# Patient Record
Sex: MALE | Race: OTHER | HISPANIC OR LATINO | URBAN - METROPOLITAN AREA
[De-identification: names, ages, dates, MRNs, and addresses within clinical notes are randomized per-mention and may not be internally consistent; named-entity substitution may affect disease eponyms.]

---

## 2020-08-25 ENCOUNTER — INPATIENT (INPATIENT)
Facility: HOSPITAL | Age: 50
LOS: 4 days | Discharge: HOME | End: 2020-08-30
Attending: HOSPITALIST | Admitting: HOSPITALIST
Payer: SELF-PAY

## 2020-08-25 VITALS
TEMPERATURE: 103 F | RESPIRATION RATE: 20 BRPM | SYSTOLIC BLOOD PRESSURE: 137 MMHG | OXYGEN SATURATION: 99 % | DIASTOLIC BLOOD PRESSURE: 79 MMHG | HEART RATE: 124 BPM

## 2020-08-25 DIAGNOSIS — Z29.9 ENCOUNTER FOR PROPHYLACTIC MEASURES, UNSPECIFIED: ICD-10-CM

## 2020-08-25 DIAGNOSIS — E11.9 TYPE 2 DIABETES MELLITUS WITHOUT COMPLICATIONS: ICD-10-CM

## 2020-08-25 DIAGNOSIS — Z02.9 ENCOUNTER FOR ADMINISTRATIVE EXAMINATIONS, UNSPECIFIED: ICD-10-CM

## 2020-08-25 DIAGNOSIS — Z98.890 OTHER SPECIFIED POSTPROCEDURAL STATES: Chronic | ICD-10-CM

## 2020-08-25 DIAGNOSIS — A41.9 SEPSIS, UNSPECIFIED ORGANISM: ICD-10-CM

## 2020-08-25 DIAGNOSIS — J18.9 PNEUMONIA, UNSPECIFIED ORGANISM: ICD-10-CM

## 2020-08-25 DIAGNOSIS — Z90.49 ACQUIRED ABSENCE OF OTHER SPECIFIED PARTS OF DIGESTIVE TRACT: Chronic | ICD-10-CM

## 2020-08-25 LAB
ALBUMIN SERPL ELPH-MCNC: 4.7 G/DL — SIGNIFICANT CHANGE UP (ref 3.3–5)
ALP SERPL-CCNC: 81 U/L — SIGNIFICANT CHANGE UP (ref 40–120)
ALT FLD-CCNC: 12 U/L — SIGNIFICANT CHANGE UP (ref 4–41)
ANION GAP SERPL CALC-SCNC: 16 MMO/L — HIGH (ref 7–14)
APPEARANCE UR: CLEAR — SIGNIFICANT CHANGE UP
APTT BLD: 32.8 SEC — SIGNIFICANT CHANGE UP (ref 27–36.3)
AST SERPL-CCNC: 14 U/L — SIGNIFICANT CHANGE UP (ref 4–40)
BACTERIA # UR AUTO: NEGATIVE — SIGNIFICANT CHANGE UP
BASE EXCESS BLDV CALC-SCNC: -1.4 MMOL/L — SIGNIFICANT CHANGE UP
BASOPHILS # BLD AUTO: 0.04 K/UL — SIGNIFICANT CHANGE UP (ref 0–0.2)
BASOPHILS NFR BLD AUTO: 0.2 % — SIGNIFICANT CHANGE UP (ref 0–2)
BILIRUB SERPL-MCNC: 1.4 MG/DL — HIGH (ref 0.2–1.2)
BILIRUB UR-MCNC: NEGATIVE — SIGNIFICANT CHANGE UP
BLOOD GAS VENOUS - CREATININE: 0.76 MG/DL — SIGNIFICANT CHANGE UP (ref 0.5–1.3)
BLOOD UR QL VISUAL: NEGATIVE — SIGNIFICANT CHANGE UP
BUN SERPL-MCNC: 12 MG/DL — SIGNIFICANT CHANGE UP (ref 7–23)
CALCIUM SERPL-MCNC: 9.2 MG/DL — SIGNIFICANT CHANGE UP (ref 8.4–10.5)
CHLORIDE BLDV-SCNC: 107 MMOL/L — SIGNIFICANT CHANGE UP (ref 96–108)
CHLORIDE SERPL-SCNC: 91 MMOL/L — LOW (ref 98–107)
CO2 SERPL-SCNC: 21 MMOL/L — LOW (ref 22–31)
COLOR SPEC: SIGNIFICANT CHANGE UP
CREAT SERPL-MCNC: 0.81 MG/DL — SIGNIFICANT CHANGE UP (ref 0.5–1.3)
EOSINOPHIL # BLD AUTO: 0 K/UL — SIGNIFICANT CHANGE UP (ref 0–0.5)
EOSINOPHIL NFR BLD AUTO: 0 % — SIGNIFICANT CHANGE UP (ref 0–6)
GAS PNL BLDV: 129 MMOL/L — LOW (ref 136–146)
GLUCOSE BLDC GLUCOMTR-MCNC: 222 MG/DL — HIGH (ref 70–99)
GLUCOSE BLDC GLUCOMTR-MCNC: 225 MG/DL — HIGH (ref 70–99)
GLUCOSE BLDV-MCNC: 230 MG/DL — HIGH (ref 70–99)
GLUCOSE SERPL-MCNC: 284 MG/DL — HIGH (ref 70–99)
GLUCOSE UR-MCNC: >1000 — HIGH
HCO3 BLDV-SCNC: 23 MMOL/L — SIGNIFICANT CHANGE UP (ref 20–27)
HCT VFR BLD CALC: 44 % — SIGNIFICANT CHANGE UP (ref 39–50)
HCT VFR BLDV CALC: 41.7 % — SIGNIFICANT CHANGE UP (ref 39–51)
HGB BLD-MCNC: 15.3 G/DL — SIGNIFICANT CHANGE UP (ref 13–17)
HGB BLDV-MCNC: 13.6 G/DL — SIGNIFICANT CHANGE UP (ref 13–17)
HYALINE CASTS # UR AUTO: NEGATIVE — SIGNIFICANT CHANGE UP
IMM GRANULOCYTES NFR BLD AUTO: 0.5 % — SIGNIFICANT CHANGE UP (ref 0–1.5)
INR BLD: 1.41 — HIGH (ref 0.88–1.16)
KETONES UR-MCNC: HIGH
LACTATE BLDV-MCNC: 1.2 MMOL/L — SIGNIFICANT CHANGE UP (ref 0.5–2)
LACTATE BLDV-MCNC: 2.2 MMOL/L — HIGH (ref 0.5–2)
LEUKOCYTE ESTERASE UR-ACNC: NEGATIVE — SIGNIFICANT CHANGE UP
LIDOCAIN IGE QN: 23.6 U/L — SIGNIFICANT CHANGE UP (ref 7–60)
LYMPHOCYTES # BLD AUTO: 1.2 K/UL — SIGNIFICANT CHANGE UP (ref 1–3.3)
LYMPHOCYTES # BLD AUTO: 5.4 % — LOW (ref 13–44)
MCHC RBC-ENTMCNC: 30.6 PG — SIGNIFICANT CHANGE UP (ref 27–34)
MCHC RBC-ENTMCNC: 34.8 % — SIGNIFICANT CHANGE UP (ref 32–36)
MCV RBC AUTO: 88 FL — SIGNIFICANT CHANGE UP (ref 80–100)
MONOCYTES # BLD AUTO: 1.55 K/UL — HIGH (ref 0–0.9)
MONOCYTES NFR BLD AUTO: 7 % — SIGNIFICANT CHANGE UP (ref 2–14)
NEUTROPHILS # BLD AUTO: 19.13 K/UL — HIGH (ref 1.8–7.4)
NEUTROPHILS NFR BLD AUTO: 86.9 % — HIGH (ref 43–77)
NITRITE UR-MCNC: NEGATIVE — SIGNIFICANT CHANGE UP
NRBC # FLD: 0 K/UL — SIGNIFICANT CHANGE UP (ref 0–0)
PCO2 BLDV: 36 MMHG — LOW (ref 41–51)
PH BLDV: 7.42 PH — SIGNIFICANT CHANGE UP (ref 7.32–7.43)
PH UR: 6 — SIGNIFICANT CHANGE UP (ref 5–8)
PLATELET # BLD AUTO: 266 K/UL — SIGNIFICANT CHANGE UP (ref 150–400)
PMV BLD: 11.4 FL — SIGNIFICANT CHANGE UP (ref 7–13)
PO2 BLDV: 51 MMHG — HIGH (ref 35–40)
POTASSIUM BLDV-SCNC: 3.7 MMOL/L — SIGNIFICANT CHANGE UP (ref 3.4–4.5)
POTASSIUM SERPL-MCNC: 3.6 MMOL/L — SIGNIFICANT CHANGE UP (ref 3.5–5.3)
POTASSIUM SERPL-SCNC: 3.6 MMOL/L — SIGNIFICANT CHANGE UP (ref 3.5–5.3)
PROT SERPL-MCNC: 7.7 G/DL — SIGNIFICANT CHANGE UP (ref 6–8.3)
PROT UR-MCNC: 50 — SIGNIFICANT CHANGE UP
PROTHROM AB SERPL-ACNC: 15.9 SEC — HIGH (ref 10.6–13.6)
RBC # BLD: 5 M/UL — SIGNIFICANT CHANGE UP (ref 4.2–5.8)
RBC # FLD: 12.6 % — SIGNIFICANT CHANGE UP (ref 10.3–14.5)
RBC CASTS # UR COMP ASSIST: SIGNIFICANT CHANGE UP (ref 0–?)
SAO2 % BLDV: 85.7 % — HIGH (ref 60–85)
SODIUM SERPL-SCNC: 128 MMOL/L — LOW (ref 135–145)
SP GR SPEC: 1.01 — SIGNIFICANT CHANGE UP (ref 1–1.04)
SQUAMOUS # UR AUTO: SIGNIFICANT CHANGE UP
UROBILINOGEN FLD QL: NORMAL — SIGNIFICANT CHANGE UP
WBC # BLD: 22.02 K/UL — HIGH (ref 3.8–10.5)
WBC # FLD AUTO: 22.02 K/UL — HIGH (ref 3.8–10.5)
WBC UR QL: SIGNIFICANT CHANGE UP (ref 0–?)

## 2020-08-25 PROCEDURE — 74177 CT ABD & PELVIS W/CONTRAST: CPT | Mod: 26

## 2020-08-25 PROCEDURE — 99285 EMERGENCY DEPT VISIT HI MDM: CPT

## 2020-08-25 PROCEDURE — 99223 1ST HOSP IP/OBS HIGH 75: CPT | Mod: GC

## 2020-08-25 PROCEDURE — 71045 X-RAY EXAM CHEST 1 VIEW: CPT | Mod: 26

## 2020-08-25 RX ORDER — DEXTROSE 50 % IN WATER 50 %
25 SYRINGE (ML) INTRAVENOUS ONCE
Refills: 0 | Status: DISCONTINUED | OUTPATIENT
Start: 2020-08-25 | End: 2020-08-30

## 2020-08-25 RX ORDER — ACETAMINOPHEN 500 MG
650 TABLET ORAL EVERY 6 HOURS
Refills: 0 | Status: DISCONTINUED | OUTPATIENT
Start: 2020-08-25 | End: 2020-08-30

## 2020-08-25 RX ORDER — INSULIN LISPRO 100/ML
VIAL (ML) SUBCUTANEOUS
Refills: 0 | Status: DISCONTINUED | OUTPATIENT
Start: 2020-08-25 | End: 2020-08-30

## 2020-08-25 RX ORDER — ENOXAPARIN SODIUM 100 MG/ML
40 INJECTION SUBCUTANEOUS DAILY
Refills: 0 | Status: DISCONTINUED | OUTPATIENT
Start: 2020-08-25 | End: 2020-08-30

## 2020-08-25 RX ORDER — ONDANSETRON 8 MG/1
4 TABLET, FILM COATED ORAL ONCE
Refills: 0 | Status: COMPLETED | OUTPATIENT
Start: 2020-08-25 | End: 2020-08-25

## 2020-08-25 RX ORDER — CEFTRIAXONE 500 MG/1
1000 INJECTION, POWDER, FOR SOLUTION INTRAMUSCULAR; INTRAVENOUS EVERY 24 HOURS
Refills: 0 | Status: COMPLETED | OUTPATIENT
Start: 2020-08-25 | End: 2020-08-30

## 2020-08-25 RX ORDER — SODIUM CHLORIDE 9 MG/ML
2500 INJECTION INTRAMUSCULAR; INTRAVENOUS; SUBCUTANEOUS ONCE
Refills: 0 | Status: COMPLETED | OUTPATIENT
Start: 2020-08-25 | End: 2020-08-25

## 2020-08-25 RX ORDER — SENNA PLUS 8.6 MG/1
2 TABLET ORAL AT BEDTIME
Refills: 0 | Status: DISCONTINUED | OUTPATIENT
Start: 2020-08-25 | End: 2020-08-30

## 2020-08-25 RX ORDER — DEXTROSE 50 % IN WATER 50 %
15 SYRINGE (ML) INTRAVENOUS ONCE
Refills: 0 | Status: DISCONTINUED | OUTPATIENT
Start: 2020-08-25 | End: 2020-08-30

## 2020-08-25 RX ORDER — METFORMIN HYDROCHLORIDE 850 MG/1
1 TABLET ORAL
Qty: 0 | Refills: 0 | DISCHARGE

## 2020-08-25 RX ORDER — ACETAMINOPHEN 500 MG
650 TABLET ORAL ONCE
Refills: 0 | Status: COMPLETED | OUTPATIENT
Start: 2020-08-25 | End: 2020-08-25

## 2020-08-25 RX ORDER — INSULIN LISPRO 100/ML
VIAL (ML) SUBCUTANEOUS AT BEDTIME
Refills: 0 | Status: DISCONTINUED | OUTPATIENT
Start: 2020-08-25 | End: 2020-08-30

## 2020-08-25 RX ORDER — GLUCAGON INJECTION, SOLUTION 0.5 MG/.1ML
1 INJECTION, SOLUTION SUBCUTANEOUS ONCE
Refills: 0 | Status: DISCONTINUED | OUTPATIENT
Start: 2020-08-25 | End: 2020-08-30

## 2020-08-25 RX ORDER — PIPERACILLIN AND TAZOBACTAM 4; .5 G/20ML; G/20ML
3.38 INJECTION, POWDER, LYOPHILIZED, FOR SOLUTION INTRAVENOUS ONCE
Refills: 0 | Status: COMPLETED | OUTPATIENT
Start: 2020-08-25 | End: 2020-08-25

## 2020-08-25 RX ORDER — INSULIN HUMAN 100 [IU]/ML
5 INJECTION, SOLUTION SUBCUTANEOUS ONCE
Refills: 0 | Status: COMPLETED | OUTPATIENT
Start: 2020-08-25 | End: 2020-08-25

## 2020-08-25 RX ORDER — DEXTROSE 50 % IN WATER 50 %
12.5 SYRINGE (ML) INTRAVENOUS ONCE
Refills: 0 | Status: DISCONTINUED | OUTPATIENT
Start: 2020-08-25 | End: 2020-08-30

## 2020-08-25 RX ORDER — AZITHROMYCIN 500 MG/1
500 TABLET, FILM COATED ORAL DAILY
Refills: 0 | Status: DISCONTINUED | OUTPATIENT
Start: 2020-08-25 | End: 2020-08-27

## 2020-08-25 RX ORDER — SODIUM CHLORIDE 9 MG/ML
1000 INJECTION, SOLUTION INTRAVENOUS
Refills: 0 | Status: DISCONTINUED | OUTPATIENT
Start: 2020-08-25 | End: 2020-08-30

## 2020-08-25 RX ADMIN — ONDANSETRON 4 MILLIGRAM(S): 8 TABLET, FILM COATED ORAL at 19:12

## 2020-08-25 RX ADMIN — Medication 650 MILLIGRAM(S): at 21:06

## 2020-08-25 RX ADMIN — SODIUM CHLORIDE 2500 MILLILITER(S): 9 INJECTION INTRAMUSCULAR; INTRAVENOUS; SUBCUTANEOUS at 19:12

## 2020-08-25 RX ADMIN — PIPERACILLIN AND TAZOBACTAM 200 GRAM(S): 4; .5 INJECTION, POWDER, LYOPHILIZED, FOR SOLUTION INTRAVENOUS at 19:12

## 2020-08-25 RX ADMIN — Medication 650 MILLIGRAM(S): at 19:12

## 2020-08-25 RX ADMIN — Medication 650 MILLIGRAM(S): at 23:12

## 2020-08-25 RX ADMIN — INSULIN HUMAN 5 UNIT(S): 100 INJECTION, SOLUTION SUBCUTANEOUS at 22:16

## 2020-08-25 NOTE — H&P ADULT - PROBLEM SELECTOR PLAN 1
CAP; Given one dose of zosyn, no cultures drawn  - Blood culture  - sputum culture  - Respiratory viral panel  - urine strep pneumo, legionella   - CTX and Azithromycin  - trend fever curve, WBC  - f/u covid result, although lower suspicion  - encourage PO hydration  - trend procalcitonin CAP; Given one dose of zosyn, no cultures drawn  - Blood culture  - sputum culture  - Respiratory viral panel  - urine legionella   - CTX and Azithromycin  - trend fever curve, WBC  - f/u covid result, although lower suspicion  - encourage PO hydration  - trend procalcitonin febrile, tachycardia, leukocytosis, evidence of PNA, will obtain cultures, Abx as below, start IVF

## 2020-08-25 NOTE — ED ADULT TRIAGE NOTE - CHIEF COMPLAINT QUOTE
states" I have nausea chest and back pain since Saturday with elevated blood sugar" fever 102.5 in triage. . h/o DM. denies any sick contacts. c/o body aches.

## 2020-08-25 NOTE — ED ADULT NURSE NOTE - NSIMPLEMENTINTERV_GEN_ALL_ED
Implemented All Fall Risk Interventions:  [place] to call system. Call bell, personal items and telephone within reach. Instruct patient to call for assistance. Room bathroom lighting operational. Non-slip footwear when patient is off stretcher. Physically safe environment: no spills, clutter or unnecessary equipment. Stretcher in lowest position, wheels locked, appropriate side rails in place. Provide visual cue, wrist band, yellow gown, etc. Monitor gait and stability. Monitor for mental status changes and reorient to person, place, and time. Review medications for side effects contributing to fall risk. Reinforce activity limits and safety measures with patient and family.
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
## 2020-08-25 NOTE — H&P ADULT - NSHPREVIEWOFSYSTEMS_GEN_ALL_CORE
REVIEW OF SYSTEMS:    CONSTITUTIONAL: + for weakness, fevers or chills  EYES/ENT: intermittently blurry vision (not acute);  No vertigo or throat pain   NECK: No pain or stiffness  RESPIRATORY: No cough, wheezing, hemoptysis; No shortness of breath  CARDIOVASCULAR: No chest pain or palpitations  GASTROINTESTINAL: + abdominal pain, nausea, diarrhe. No vomiting, or hematemesis; No constipation. No melena or hematochezia.  GENITOURINARY: No dysuria, frequency or hematuria  NEUROLOGICAL: No numbness  SKIN: No itching, rashes Constitutional Symptoms: +weakness, fevers, chills  Eyes: No visual changes, headache, eye pain, double vision  Ears, Nose, Mouth, Throat: No runny nose, sinus pain, ear pain, tinnitus, sore throat, dysphagia, odynophagia  Cardiovascular: No chest pain, palpitations, edema  Respiratory: No cough, wheezing, hemoptysis, shortness of breath  Gastrointestinal: + abdominal pain, nausea, diarrhea. No vomiting, or hematemesis; No constipation. No melena or hematochezia.  Genitourinary: No dysuria, frequency, hematuria  Musculoskeletal: No joint pain, joint swelling, decreased ROM  Skin: No pruritus, rashes, lesions, wounds  Neurologic:  No seizures, headache, paraesthesia, numbness, limb weakness    Positives and pertinent negatives noted and all other systems negative.

## 2020-08-25 NOTE — H&P ADULT - PROBLEM SELECTOR PLAN 5
Transitions of Care Status:  1.  Name of PCP: None  2.  PCP Contacted on Admission: [ ] Y    [ ] N    3.  PCP contacted at Discharge: [ ] Y    [ ] N    [ ] N/A  4.  Post-Discharge Appointment Date and Location:  5.  Summary of Handoff given to PCP:

## 2020-08-25 NOTE — H&P ADULT - NSHPSOCIALHISTORY_GEN_ALL_CORE
Pt lives with wife and one daughter (other daughter recently moved out). Works as supervisor for [employer]. 22 pack yr smoking hx, denies alc/drug use. Of note, sister was acting as PCP for pt

## 2020-08-25 NOTE — ED PROVIDER NOTE - CLINICAL SUMMARY MEDICAL DECISION MAKING FREE TEXT BOX
fever, abdominal pain, diarrhea. exam sig for epigastric tenderness - differential includes sepsis 2/2 pancreatitis/ colitis. plan for sepsis work up, COVID swab, symptomatic care with tylenol/ zofran, u/a. Will cover with Zosyn and give IVF and also r/o DKA. fever, abdominal pain, diarrhea. exam sig for epigastric tenderness - differential includes sepsis 2/2 pancreatitis/ colitis. plan for sepsis work up, COVID swab, symptomatic care with tylenol/ zofran, u/a. Will cover with Zosyn and give IVF and also r/o DKA. CT Abd pelvis showing RLL pneumonia.  Urine with ketones, will give insulin and continue zosyn. Will admit to medicine Sugar ASCENCIO: fever, abdominal pain, diarrhea. exam sig for epigastric tenderness - differential includes sepsis 2/2 pancreatitis/ colitis. plan for sepsis work up, COVID swab, symptomatic care with tylenol/ zofran, u/a. Will cover with Zosyn and give IVF and also r/o DKA. CT Abd pelvis showing RLL pneumonia.  Urine with ketones, will give insulin and continue zosyn. Will admit to medicine

## 2020-08-25 NOTE — ED ADULT NURSE NOTE - OBJECTIVE STATEMENT
51 y/o male arrives to E.D. rm 25 c/o fever, diarrhea, hyperglycemia since last Sunday. A&Ox4, ambulatory, neg SOB, respirations equal & unlabored, denies chest pain/palpitations, endorses nausea & "greasy diarrhea," neg vomiting, appetite has been poor. Pt states sxs began Sunday night after eating [employer] fast food. Pt endorses having similar sxs 10 years ago when he had pancreatitis. L 20g IV placed, labs sent. Medicated as per eMAR. Will continue to monitor.

## 2020-08-25 NOTE — H&P ADULT - NSHPPHYSICALEXAM_GEN_ALL_CORE
VITALS:   T(C): 37.9 (08-25-20 @ 22:16), Max: 39.2 (08-25-20 @ 17:28)  HR: 97 (08-25-20 @ 22:16) (97 - 124)  BP: 147/58 (08-25-20 @ 22:16) (137/79 - 147/58)  RR: 20 (08-25-20 @ 22:16) (20 - 20)  SpO2: 100% (08-25-20 @ 22:16) (99% - 100%)    GENERAL: Laying in bed with rigors  HEAD:  Atraumatic, Normocephalic  EYES: EOMI, PERRLA, conjunctiva and sclera clear  ENT: Moist mucous membranes  NECK: Supple, No JVD  CHEST/LUNG: RLL with mildly decreased lung sounds; No rales, rhonchi, wheezing, or rubs. Unlabored respirations  HEART: Regular rate and rhythm; No murmurs, rubs, or gallops  ABDOMEN: NABS; Soft, obese, tender on R side  EXTREMITIES:  2+ Peripheral Pulses, brisk capillary refill. No clubbing, cyanosis, or edema  NERVOUS SYSTEM:  A&Ox3, no focal deficits   SKIN: No rashes or lesions VITALS:   T(C): 37.9 (08-25-20 @ 22:16), Max: 39.2 (08-25-20 @ 17:28)  HR: 97 (08-25-20 @ 22:16) (97 - 124)  BP: 147/58 (08-25-20 @ 22:16) (137/79 - 147/58)  RR: 20 (08-25-20 @ 22:16) (20 - 20)  SpO2: 100% (08-25-20 @ 22:16) (99% - 100%)    GENERAL: Laying in bed with rigors  HEAD:  Atraumatic, Normocephalic  EYES: EOMI, PERRLA, conjunctiva and sclera clear  ENT: Moist mucous membranes  NECK: Supple, No JVD  CHEST/LUNG: RLL with mildly decreased lung sounds; No rales, rhonchi, wheezing, or rubs. Unlabored respirations  HEART: Tachycardic, Regular rhythm; No murmurs, rubs, or gallops  ABDOMEN: NABS; Soft, obese, tender on R side  EXTREMITIES:  2+ Peripheral Pulses, brisk capillary refill. No clubbing, cyanosis, or edema  NERVOUS SYSTEM:  A&Ox3, no focal deficits   SKIN: No rashes or lesions VITALS:   T(C): 37.9 (08-25-20 @ 22:16), Max: 39.2 (08-25-20 @ 17:28)  HR: 97 (08-25-20 @ 22:16) (97 - 124)  BP: 147/58 (08-25-20 @ 22:16) (137/79 - 147/58)  RR: 20 (08-25-20 @ 22:16) (20 - 20)  SpO2: 100% (08-25-20 @ 22:16) (99% - 100%)    Constitutional: Laying in bed with rigors  Ears, Nose, Mouth, and Throat: normal external ears and nose, normal hearing, moist oral mucosa  Eyes: normal conjunctiva, EOMI, PERRL  Neck: supple, no JVD  Respiratory: RLL with mildly decreased lung sounds; No rales, rhonchi, wheezing, or rubs. Unlabored respirations  Cardiovascular: Tachycardic, no M/R/G, no edema, 2+ Peripheral Pulses  Gastrointestinal: NABS; Soft, obese, tender on R side  Skin: warm, dry, no rash  Neurologic: sensation grossly intact, CN grossly intact, non-focal exam  Musculoskeletal: no clubbing, no cyanosis, no joint swelling  Psychiatric: AOX3, appropriate mood, affect
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
## 2020-08-25 NOTE — H&P ADULT - PROBLEM SELECTOR PLAN 2
Insulin naive  - lispro SS  - fsg before meals and bedtime CAP; Given one dose of zosyn, no cultures drawn  - Blood culture  - sputum culture  - Respiratory viral panel  - urine legionella   - CTX and Azithromycin  - trend fever curve, WBC  - f/u covid result, although lower suspicion  - encourage PO hydration  - trend procalcitonin
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
## 2020-08-25 NOTE — H&P ADULT - PROBLEM SELECTOR PROBLEM 1
Pneumonia of right lower lobe due to infectious organism Sepsis, due to unspecified organism, unspecified whether acute organ dysfunction present

## 2020-08-25 NOTE — H&P ADULT - PROBLEM SELECTOR PLAN 4
Transitions of Care Status:  1.  Name of PCP: None  2.  PCP Contacted on Admission: [ ] Y    [ ] N    3.  PCP contacted at Discharge: [ ] Y    [ ] N    [ ] N/A  4.  Post-Discharge Appointment Date and Location:  5.  Summary of Handoff given to PCP: Lovenox 40

## 2020-08-25 NOTE — ED PROVIDER NOTE - CARE PLAN
Principal Discharge DX:	Sepsis  Secondary Diagnosis:	Pneumonia of right lower lobe due to infectious organism

## 2020-08-25 NOTE — ED ADULT NURSE NOTE - ED STAT RN HANDOFF DETAILS
report given to FRANCIS Joseph. Pt a&ox4 and ambulatory. Pt appears to be resting comfortably and denies any pain or discomfort. Pt respirations even and unlabored. vital signs as noted, call bell in reach, will continue to monitor till transport.

## 2020-08-25 NOTE — ED PROVIDER NOTE - CROS ED [facility] ALL NEG
"SN    See message below.  Spoke with patient.  He received received a refill for Oxycodone in the mail for #120 from [facility] and he states he also has Rx for December.  There seems to be some confusion with his rx.  He wasn't Oxycodone to be sent to [facility].  Rx she just received was signed by CW. No documentation regarding this.    He last saw you 9/23 and is due to see you in December but he already has that Rx  \"I don't want to get in trouble not sure how this happened.  I will come in and see her in December if she wants\"  :  Refills 10/27 for #180 and 11/18 for #180 both signed by CW      Please advise.  Thanks,  Gayathri Wade RN    "
negative...
Yes

## 2020-08-25 NOTE — H&P ADULT - NSHPLABSRESULTS_GEN_ALL_CORE
LABS:  cret                        15.3   22.02 )-----------( 266      ( 25 Aug 2020 19:00 )             44.0     08-25    128<L>  |  91<L>  |  12  ----------------------------<  284<H>  3.6   |  21<L>  |  0.81    Ca    9.2      25 Aug 2020 19:00    TPro  7.7  /  Alb  4.7  /  TBili  1.4<H>  /  DBili  x   /  AST  14  /  ALT  12  /  AlkPhos  81  08-25    PT/INR - ( 25 Aug 2020 19:00 )   PT: 15.9 SEC;   INR: 1.41          PTT - ( 25 Aug 2020 19:00 )  PTT:32.8 SEC    Lactate 2.2 to 1.2  VBG pH7.4 pCO2 36  Urine +glucose, moderate ketones, protein, 3-5 WBC    CXR clear    CT chest: RLL with concern for PNA LABS:                        15.3   22.02 )-----------( 266      ( 25 Aug 2020 19:00 )             44.0     08-25    128<L>  |  91<L>  |  12  ----------------------------<  284<H>  3.6   |  21<L>  |  0.81    Ca    9.2      25 Aug 2020 19:00    TPro  7.7  /  Alb  4.7  /  TBili  1.4<H>  /  DBili  x   /  AST  14  /  ALT  12  /  AlkPhos  81  08-25    PT/INR - ( 25 Aug 2020 19:00 )   PT: 15.9 SEC;   INR: 1.41          PTT - ( 25 Aug 2020 19:00 )  PTT:32.8 SEC    Lactate 2.2 to 1.2  VBG pH7.4 pCO2 36  Urine +glucose, moderate ketones, protein, 3-5 WBC    CXR clear    CT chest: RLL with concern for PNA

## 2020-08-26 DIAGNOSIS — E11.9 TYPE 2 DIABETES MELLITUS WITHOUT COMPLICATIONS: ICD-10-CM

## 2020-08-26 DIAGNOSIS — A41.9 SEPSIS, UNSPECIFIED ORGANISM: ICD-10-CM

## 2020-08-26 DIAGNOSIS — R19.7 DIARRHEA, UNSPECIFIED: ICD-10-CM

## 2020-08-26 LAB
ALBUMIN SERPL ELPH-MCNC: 4 G/DL — SIGNIFICANT CHANGE UP (ref 3.3–5)
ALP SERPL-CCNC: 70 U/L — SIGNIFICANT CHANGE UP (ref 40–120)
ALT FLD-CCNC: 14 U/L — SIGNIFICANT CHANGE UP (ref 4–41)
ANION GAP SERPL CALC-SCNC: 15 MMO/L — HIGH (ref 7–14)
AST SERPL-CCNC: 15 U/L — SIGNIFICANT CHANGE UP (ref 4–40)
B-OH-BUTYR SERPL-SCNC: 0.8 MMOL/L — HIGH (ref 0–0.4)
BASOPHILS # BLD AUTO: 0.04 K/UL — SIGNIFICANT CHANGE UP (ref 0–0.2)
BASOPHILS NFR BLD AUTO: 0.2 % — SIGNIFICANT CHANGE UP (ref 0–2)
BILIRUB SERPL-MCNC: 0.7 MG/DL — SIGNIFICANT CHANGE UP (ref 0.2–1.2)
BUN SERPL-MCNC: 10 MG/DL — SIGNIFICANT CHANGE UP (ref 7–23)
CALCIUM SERPL-MCNC: 8.7 MG/DL — SIGNIFICANT CHANGE UP (ref 8.4–10.5)
CHLORIDE SERPL-SCNC: 95 MMOL/L — LOW (ref 98–107)
CO2 SERPL-SCNC: 21 MMOL/L — LOW (ref 22–31)
CREAT SERPL-MCNC: 0.61 MG/DL — SIGNIFICANT CHANGE UP (ref 0.5–1.3)
EOSINOPHIL # BLD AUTO: 0.01 K/UL — SIGNIFICANT CHANGE UP (ref 0–0.5)
EOSINOPHIL NFR BLD AUTO: 0 % — SIGNIFICANT CHANGE UP (ref 0–6)
GLUCOSE BLDC GLUCOMTR-MCNC: 184 MG/DL — HIGH (ref 70–99)
GLUCOSE BLDC GLUCOMTR-MCNC: 215 MG/DL — HIGH (ref 70–99)
GLUCOSE BLDC GLUCOMTR-MCNC: 231 MG/DL — HIGH (ref 70–99)
GLUCOSE BLDC GLUCOMTR-MCNC: 231 MG/DL — HIGH (ref 70–99)
GLUCOSE BLDC GLUCOMTR-MCNC: 243 MG/DL — HIGH (ref 70–99)
GLUCOSE SERPL-MCNC: 234 MG/DL — HIGH (ref 70–99)
HBA1C BLD-MCNC: 9.2 % — HIGH (ref 4–5.6)
HCT VFR BLD CALC: 43.6 % — SIGNIFICANT CHANGE UP (ref 39–50)
HGB BLD-MCNC: 14.6 G/DL — SIGNIFICANT CHANGE UP (ref 13–17)
IMM GRANULOCYTES NFR BLD AUTO: 0.3 % — SIGNIFICANT CHANGE UP (ref 0–1.5)
LYMPHOCYTES # BLD AUTO: 0.97 K/UL — LOW (ref 1–3.3)
LYMPHOCYTES # BLD AUTO: 4.8 % — LOW (ref 13–44)
MAGNESIUM SERPL-MCNC: 1.9 MG/DL — SIGNIFICANT CHANGE UP (ref 1.6–2.6)
MCHC RBC-ENTMCNC: 29.9 PG — SIGNIFICANT CHANGE UP (ref 27–34)
MCHC RBC-ENTMCNC: 33.5 % — SIGNIFICANT CHANGE UP (ref 32–36)
MCV RBC AUTO: 89.3 FL — SIGNIFICANT CHANGE UP (ref 80–100)
MONOCYTES # BLD AUTO: 1.28 K/UL — HIGH (ref 0–0.9)
MONOCYTES NFR BLD AUTO: 6.3 % — SIGNIFICANT CHANGE UP (ref 2–14)
NEUTROPHILS # BLD AUTO: 17.92 K/UL — HIGH (ref 1.8–7.4)
NEUTROPHILS NFR BLD AUTO: 88.4 % — HIGH (ref 43–77)
NRBC # FLD: 0 K/UL — SIGNIFICANT CHANGE UP (ref 0–0)
PHOSPHATE SERPL-MCNC: 1.9 MG/DL — LOW (ref 2.5–4.5)
PLATELET # BLD AUTO: 227 K/UL — SIGNIFICANT CHANGE UP (ref 150–400)
PMV BLD: 10.5 FL — SIGNIFICANT CHANGE UP (ref 7–13)
POTASSIUM SERPL-MCNC: 3.7 MMOL/L — SIGNIFICANT CHANGE UP (ref 3.5–5.3)
POTASSIUM SERPL-SCNC: 3.7 MMOL/L — SIGNIFICANT CHANGE UP (ref 3.5–5.3)
PROCALCITONIN SERPL-MCNC: 0.19 NG/ML — HIGH (ref 0.02–0.1)
PROCALCITONIN SERPL-MCNC: 0.55 NG/ML — HIGH (ref 0.02–0.1)
PROT SERPL-MCNC: 6.9 G/DL — SIGNIFICANT CHANGE UP (ref 6–8.3)
RBC # BLD: 4.88 M/UL — SIGNIFICANT CHANGE UP (ref 4.2–5.8)
RBC # FLD: 12.3 % — SIGNIFICANT CHANGE UP (ref 10.3–14.5)
SARS-COV-2 RNA SPEC QL NAA+PROBE: SIGNIFICANT CHANGE UP
SODIUM SERPL-SCNC: 131 MMOL/L — LOW (ref 135–145)
WBC # BLD: 20.29 K/UL — HIGH (ref 3.8–10.5)
WBC # FLD AUTO: 20.29 K/UL — HIGH (ref 3.8–10.5)

## 2020-08-26 PROCEDURE — 99233 SBSQ HOSP IP/OBS HIGH 50: CPT | Mod: GC

## 2020-08-26 RX ORDER — ACETAMINOPHEN 500 MG
650 TABLET ORAL ONCE
Refills: 0 | Status: COMPLETED | OUTPATIENT
Start: 2020-08-26 | End: 2020-08-26

## 2020-08-26 RX ORDER — SODIUM CHLORIDE 9 MG/ML
1000 INJECTION INTRAMUSCULAR; INTRAVENOUS; SUBCUTANEOUS
Refills: 0 | Status: DISCONTINUED | OUTPATIENT
Start: 2020-08-26 | End: 2020-08-30

## 2020-08-26 RX ADMIN — Medication 650 MILLIGRAM(S): at 07:54

## 2020-08-26 RX ADMIN — Medication 650 MILLIGRAM(S): at 04:23

## 2020-08-26 RX ADMIN — Medication 650 MILLIGRAM(S): at 21:46

## 2020-08-26 RX ADMIN — ENOXAPARIN SODIUM 40 MILLIGRAM(S): 100 INJECTION SUBCUTANEOUS at 12:47

## 2020-08-26 RX ADMIN — Medication 650 MILLIGRAM(S): at 07:10

## 2020-08-26 RX ADMIN — SODIUM CHLORIDE 100 MILLILITER(S): 9 INJECTION INTRAMUSCULAR; INTRAVENOUS; SUBCUTANEOUS at 09:09

## 2020-08-26 RX ADMIN — AZITHROMYCIN 255 MILLIGRAM(S): 500 TABLET, FILM COATED ORAL at 12:47

## 2020-08-26 RX ADMIN — Medication 2: at 08:40

## 2020-08-26 RX ADMIN — Medication 650 MILLIGRAM(S): at 22:41

## 2020-08-26 RX ADMIN — Medication 2: at 18:16

## 2020-08-26 RX ADMIN — CEFTRIAXONE 100 MILLIGRAM(S): 500 INJECTION, POWDER, FOR SOLUTION INTRAMUSCULAR; INTRAVENOUS at 04:35

## 2020-08-26 RX ADMIN — Medication 650 MILLIGRAM(S): at 19:01

## 2020-08-26 RX ADMIN — Medication 2: at 12:47

## 2020-08-26 RX ADMIN — SODIUM CHLORIDE 100 MILLILITER(S): 9 INJECTION INTRAMUSCULAR; INTRAVENOUS; SUBCUTANEOUS at 21:46

## 2020-08-26 RX ADMIN — Medication 650 MILLIGRAM(S): at 15:26

## 2020-08-26 NOTE — PROGRESS NOTE ADULT - PROBLEM SELECTOR PLAN 2
CAP; Given one dose of zosyn, no cultures drawn  - Blood culture  - sputum culture  - Respiratory viral panel  - urine legionella   - CTX and Azithromycin  - trend fever curve, WBC  - f/u covid result, although lower suspicion  - encourage PO hydration  - trend procalcitonin CAP; Given one dose of zosyn  - Blood culture  - sputum culture    - urine legionella   - CTX and Azithromycin  - trend fever curve, WBC  - f/u covid result, although lower suspicion  - encourage PO hydration  - trend procalcitonin

## 2020-08-26 NOTE — PROGRESS NOTE ADULT - PROBLEM SELECTOR PLAN 1
febrile, tachycardia, leukocytosis, evidence of PNA, will obtain cultures, Abx as below, start IVF febrile, tachycardia, leukocytosis, CT A/P  showing evidence of PNA RLL, however no cough, or SOB, unclear source as pt continues to have fevers today  -abx as below  -F/u cultures

## 2020-08-26 NOTE — PROGRESS NOTE ADULT - ATTENDING COMMENTS
Pt non-toxic but still experiencing fever and chills. Sepsis on admission of unclear source, but suspect acute gastroenteritis. PNA reported but not consistent with symptoms. Will check POCUS to see if there is any lung consolidation. Continue broad spectrum abx and f/u blood cultures.

## 2020-08-26 NOTE — PROGRESS NOTE ADULT - SUBJECTIVE AND OBJECTIVE BOX
PROGRESS NOTE:     CONTACT INFO:  Maeve Blackman MD | PGY-1  Pager: [phone] [place], [id number] [facility]  After 7pm page night float      Patient is a 50y old  Male who presents with a chief complaint of nausea, abdominal pain, fever (25 Aug 2020 23:07)      SUBJECTIVE / OVERNIGHT EVENTS:    - No acute events overnight.   - No fevers/chills.  - Patient seen and evaluated at bedside.  - Denies SOB at rest, chest pain, palpitations, abdominal pain, nausea/vomiting    ADDITIONAL REVIEW OF SYSTEMS:    MEDICATIONS  (STANDING):  azithromycin  IVPB 500 milliGRAM(s) IV Intermittent daily  cefTRIAXone   IVPB 1000 milliGRAM(s) IV Intermittent every 24 hours  dextrose 5%. 1000 milliLiter(s) (50 mL/Hr) IV Continuous <Continuous>  dextrose 50% Injectable 12.5 Gram(s) IV Push once  dextrose 50% Injectable 25 Gram(s) IV Push once  dextrose 50% Injectable 25 Gram(s) IV Push once  enoxaparin Injectable 40 milliGRAM(s) SubCutaneous daily  insulin lispro (HumaLOG) corrective regimen sliding scale   SubCutaneous three times a day before meals  insulin lispro (HumaLOG) corrective regimen sliding scale   SubCutaneous at bedtime  sodium chloride 0.9%. 1000 milliLiter(s) (100 mL/Hr) IV Continuous <Continuous>    MEDICATIONS  (PRN):  acetaminophen   Tablet .. 650 milliGRAM(s) Oral every 6 hours PRN Temp greater or equal to 38C (100.4F), Mild Pain (1 - 3), Moderate Pain (4 - 6)  dextrose 40% Gel 15 Gram(s) Oral once PRN Blood Glucose LESS THAN 70 milliGRAM(s)/deciliter  glucagon  Injectable 1 milliGRAM(s) IntraMuscular once PRN Glucose LESS THAN 70 milligrams/deciliter  senna 2 Tablet(s) Oral at bedtime PRN Constipation      CAPILLARY BLOOD GLUCOSE      POCT Blood Glucose.: 231 mg/dL (26 Aug 2020 08:52)  POCT Blood Glucose.: 184 mg/dL (26 Aug 2020 00:18)  POCT Blood Glucose.: 222 mg/dL (25 Aug 2020 22:09)  POCT Blood Glucose.: 225 mg/dL (25 Aug 2020 21:47)  POCT Blood Glucose.: 296 mg/dL (25 Aug 2020 17:30)    I&O's Summary      PHYSICAL EXAM:  Vital Signs Last 24 Hrs  T(C): 39.4 (26 Aug 2020 07:35), Max: 39.4 (26 Aug 2020 03:35)  T(F): 102.9 (26 Aug 2020 07:35), Max: 103 (26 Aug 2020 03:35)  HR: 91 (26 Aug 2020 06:20) (91 - 124)  BP: 139/66 (26 Aug 2020 06:20) (137/79 - 162/85)  BP(mean): --  RR: 18 (26 Aug 2020 06:20) (18 - 20)  SpO2: 100% (26 Aug 2020 06:20) (99% - 100%)    CONSTITUTIONAL: NAD, well-developed  RESPIRATORY: Normal respiratory effort; lungs are clear to auscultation bilaterally  CARDIOVASCULAR: Regular rate and rhythm, normal S1 and S2, no murmur/rub/gallop; No lower extremity edema; Peripheral pulses are 2+ bilaterally  ABDOMEN: Nontender to palpation, normoactive bowel sounds, no rebound/guarding; No hepatosplenomegaly  MUSCLOSKELETAL: no clubbing or cyanosis of digits; no joint swelling or tenderness to palpation  PSYCH: A+O to person, place, and time; affect appropriate    LABS:                        15.3   22.02 )-----------( 266      ( 25 Aug 2020 19:00 )             44.0     08-25    128<L>  |  91<L>  |  12  ----------------------------<  284<H>  3.6   |  21<L>  |  0.81    Ca    9.2      25 Aug 2020 19:00    TPro  7.7  /  Alb  4.7  /  TBili  1.4<H>  /  DBili  x   /  AST  14  /  ALT  12  /  AlkPhos  81  08-25    PT/INR - ( 25 Aug 2020 19:00 )   PT: 15.9 SEC;   INR: 1.41          PTT - ( 25 Aug 2020 19:00 )  PTT:32.8 SEC      Urinalysis Basic - ( 25 Aug 2020 20:29 )    Color: LIGHT YELLOW / Appearance: CLEAR / S.015 / pH: 6.0  Gluc: >1000 / Ketone: MODERATE  / Bili: NEGATIVE / Urobili: NORMAL   Blood: NEGATIVE / Protein: 50 / Nitrite: NEGATIVE   Leuk Esterase: NEGATIVE / RBC: 0-2 / WBC 3-5   Sq Epi: OCC / Non Sq Epi: x / Bacteria: NEGATIVE          RADIOLOGY & ADDITIONAL TESTS:  Results Reviewed:   Imaging Personally Reviewed:  Electrocardiogram Personally Reviewed:    COORDINATION OF CARE:  Care Discussed with Consultants/Other Providers [Y/N]: Y  Prior or Outpatient Records Reviewed [Y/N]: Y PROGRESS NOTE:     CONTACT INFO:  Maeve Blackman MD | PGY-1  Pager: [phone] [place], [id number] [facility]  After 7pm page night float      Patient is a 50y old  Male who presents with a chief complaint of nausea, abdominal pain, fever (25 Aug 2020 23:07)      SUBJECTIVE / OVERNIGHT EVENTS:    - O/n febrile.  - No fevers/chills.  - Patient seen and evaluated at bedside.  - Today still c/o abd pain. Denies any cough, SOB. Denies any sick contacts. Reports passing gas.    ADDITIONAL REVIEW OF SYSTEMS:    MEDICATIONS  (STANDING):  azithromycin  IVPB 500 milliGRAM(s) IV Intermittent daily  cefTRIAXone   IVPB 1000 milliGRAM(s) IV Intermittent every 24 hours  dextrose 5%. 1000 milliLiter(s) (50 mL/Hr) IV Continuous <Continuous>  dextrose 50% Injectable 12.5 Gram(s) IV Push once  dextrose 50% Injectable 25 Gram(s) IV Push once  dextrose 50% Injectable 25 Gram(s) IV Push once  enoxaparin Injectable 40 milliGRAM(s) SubCutaneous daily  insulin lispro (HumaLOG) corrective regimen sliding scale   SubCutaneous three times a day before meals  insulin lispro (HumaLOG) corrective regimen sliding scale   SubCutaneous at bedtime  sodium chloride 0.9%. 1000 milliLiter(s) (100 mL/Hr) IV Continuous <Continuous>    MEDICATIONS  (PRN):  acetaminophen   Tablet .. 650 milliGRAM(s) Oral every 6 hours PRN Temp greater or equal to 38C (100.4F), Mild Pain (1 - 3), Moderate Pain (4 - 6)  dextrose 40% Gel 15 Gram(s) Oral once PRN Blood Glucose LESS THAN 70 milliGRAM(s)/deciliter  glucagon  Injectable 1 milliGRAM(s) IntraMuscular once PRN Glucose LESS THAN 70 milligrams/deciliter  senna 2 Tablet(s) Oral at bedtime PRN Constipation      CAPILLARY BLOOD GLUCOSE      POCT Blood Glucose.: 231 mg/dL (26 Aug 2020 08:52)  POCT Blood Glucose.: 184 mg/dL (26 Aug 2020 00:18)  POCT Blood Glucose.: 222 mg/dL (25 Aug 2020 22:09)  POCT Blood Glucose.: 225 mg/dL (25 Aug 2020 21:47)  POCT Blood Glucose.: 296 mg/dL (25 Aug 2020 17:30)    I&O's Summary      PHYSICAL EXAM:  Vital Signs Last 24 Hrs  T(C): 39.4 (26 Aug 2020 07:35), Max: 39.4 (26 Aug 2020 03:35)  T(F): 102.9 (26 Aug 2020 07:35), Max: 103 (26 Aug 2020 03:35)  HR: 91 (26 Aug 2020 06:20) (91 - 124)  BP: 139/66 (26 Aug 2020 06:20) (137/79 - 162/85)  BP(mean): --  RR: 18 (26 Aug 2020 06:20) (18 - 20)  SpO2: 100% (26 Aug 2020 06:20) (99% - 100%)    CONSTITUTIONAL: NAD, well-developed  RESPIRATORY: Normal respiratory effort; lungs are clear to auscultation bilaterally  CARDIOVASCULAR: Regular rate and rhythm, normal S1 and S2, no murmur/rub/gallop; No lower extremity edema; Peripheral pulses are 2+ bilaterally  ABDOMEN: Nontender to palpation, normoactive bowel sounds, no rebound/guarding; No hepatosplenomegaly  MUSCLOSKELETAL: no clubbing or cyanosis of digits; no joint swelling or tenderness to palpation  PSYCH: A+O to person, place, and time; affect appropriate    LABS:                        15.3   22.02 )-----------( 266      ( 25 Aug 2020 19:00 )             44.0     08-25    128<L>  |  91<L>  |  12  ----------------------------<  284<H>  3.6   |  21<L>  |  0.81    Ca    9.2      25 Aug 2020 19:00    TPro  7.7  /  Alb  4.7  /  TBili  1.4<H>  /  DBili  x   /  AST  14  /  ALT  12  /  AlkPhos  81  08-25    PT/INR - ( 25 Aug 2020 19:00 )   PT: 15.9 SEC;   INR: 1.41          PTT - ( 25 Aug 2020 19:00 )  PTT:32.8 SEC      Urinalysis Basic - ( 25 Aug 2020 20:29 )    Color: LIGHT YELLOW / Appearance: CLEAR / S.015 / pH: 6.0  Gluc: >1000 / Ketone: MODERATE  / Bili: NEGATIVE / Urobili: NORMAL   Blood: NEGATIVE / Protein: 50 / Nitrite: NEGATIVE   Leuk Esterase: NEGATIVE / RBC: 0-2 / WBC 3-5   Sq Epi: OCC / Non Sq Epi: x / Bacteria: NEGATIVE          RADIOLOGY & ADDITIONAL TESTS:  Results Reviewed:   Imaging Personally Reviewed:  Electrocardiogram Personally Reviewed:    COORDINATION OF CARE:  Care Discussed with Consultants/Other Providers [Y/N]: Y  Prior or Outpatient Records Reviewed [Y/N]: Y

## 2020-08-26 NOTE — ED ADULT NURSE REASSESSMENT NOTE - NS ED NURSE REASSESS COMMENT FT1
Report given to floor Rn by Ani Burton. Pt brought to floor at this time, Denies any acute complaints.

## 2020-08-26 NOTE — PROGRESS NOTE ADULT - PROBLEM SELECTOR PLAN 3
Insulin naive  - lispro SS  - fsg before meals and bedtime -reports greasy stools, pt has hx of pancreatitis  -GI PCR
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
## 2020-08-27 LAB
ANION GAP SERPL CALC-SCNC: 14 MMO/L — SIGNIFICANT CHANGE UP (ref 7–14)
B PERT DNA SPEC QL NAA+PROBE: NOT DETECTED — SIGNIFICANT CHANGE UP
BASOPHILS # BLD AUTO: 0.02 K/UL — SIGNIFICANT CHANGE UP (ref 0–0.2)
BASOPHILS NFR BLD AUTO: 0.2 % — SIGNIFICANT CHANGE UP (ref 0–2)
BUN SERPL-MCNC: 9 MG/DL — SIGNIFICANT CHANGE UP (ref 7–23)
C PNEUM DNA SPEC QL NAA+PROBE: NOT DETECTED — SIGNIFICANT CHANGE UP
CALCIUM SERPL-MCNC: 8.3 MG/DL — LOW (ref 8.4–10.5)
CHLORIDE SERPL-SCNC: 95 MMOL/L — LOW (ref 98–107)
CO2 SERPL-SCNC: 22 MMOL/L — SIGNIFICANT CHANGE UP (ref 22–31)
CREAT SERPL-MCNC: 0.62 MG/DL — SIGNIFICANT CHANGE UP (ref 0.5–1.3)
CULTURE RESULTS: NO GROWTH — SIGNIFICANT CHANGE UP
EOSINOPHIL # BLD AUTO: 0 K/UL — SIGNIFICANT CHANGE UP (ref 0–0.5)
EOSINOPHIL NFR BLD AUTO: 0 % — SIGNIFICANT CHANGE UP (ref 0–6)
FLUAV H1 2009 PAND RNA SPEC QL NAA+PROBE: NOT DETECTED — SIGNIFICANT CHANGE UP
FLUAV H1 RNA SPEC QL NAA+PROBE: NOT DETECTED — SIGNIFICANT CHANGE UP
FLUAV H3 RNA SPEC QL NAA+PROBE: NOT DETECTED — SIGNIFICANT CHANGE UP
FLUAV SUBTYP SPEC NAA+PROBE: NOT DETECTED — SIGNIFICANT CHANGE UP
FLUBV RNA SPEC QL NAA+PROBE: NOT DETECTED — SIGNIFICANT CHANGE UP
GLUCOSE BLDC GLUCOMTR-MCNC: 198 MG/DL — HIGH (ref 70–99)
GLUCOSE BLDC GLUCOMTR-MCNC: 209 MG/DL — HIGH (ref 70–99)
GLUCOSE BLDC GLUCOMTR-MCNC: 225 MG/DL — HIGH (ref 70–99)
GLUCOSE BLDC GLUCOMTR-MCNC: 226 MG/DL — HIGH (ref 70–99)
GLUCOSE SERPL-MCNC: 212 MG/DL — HIGH (ref 70–99)
HADV DNA SPEC QL NAA+PROBE: NOT DETECTED — SIGNIFICANT CHANGE UP
HCOV PNL SPEC NAA+PROBE: SIGNIFICANT CHANGE UP
HCT VFR BLD CALC: 39.1 % — SIGNIFICANT CHANGE UP (ref 39–50)
HGB BLD-MCNC: 13.3 G/DL — SIGNIFICANT CHANGE UP (ref 13–17)
HMPV RNA SPEC QL NAA+PROBE: NOT DETECTED — SIGNIFICANT CHANGE UP
HPIV1 RNA SPEC QL NAA+PROBE: NOT DETECTED — SIGNIFICANT CHANGE UP
HPIV2 RNA SPEC QL NAA+PROBE: NOT DETECTED — SIGNIFICANT CHANGE UP
HPIV3 RNA SPEC QL NAA+PROBE: NOT DETECTED — SIGNIFICANT CHANGE UP
HPIV4 RNA SPEC QL NAA+PROBE: NOT DETECTED — SIGNIFICANT CHANGE UP
IMM GRANULOCYTES NFR BLD AUTO: 0.4 % — SIGNIFICANT CHANGE UP (ref 0–1.5)
L PNEUMO AG UR QL: NEGATIVE — SIGNIFICANT CHANGE UP
LYMPHOCYTES # BLD AUTO: 0.63 K/UL — LOW (ref 1–3.3)
LYMPHOCYTES # BLD AUTO: 5 % — LOW (ref 13–44)
MAGNESIUM SERPL-MCNC: 1.9 MG/DL — SIGNIFICANT CHANGE UP (ref 1.6–2.6)
MCHC RBC-ENTMCNC: 29.6 PG — SIGNIFICANT CHANGE UP (ref 27–34)
MCHC RBC-ENTMCNC: 34 % — SIGNIFICANT CHANGE UP (ref 32–36)
MCV RBC AUTO: 86.9 FL — SIGNIFICANT CHANGE UP (ref 80–100)
MONOCYTES # BLD AUTO: 0.93 K/UL — HIGH (ref 0–0.9)
MONOCYTES NFR BLD AUTO: 7.4 % — SIGNIFICANT CHANGE UP (ref 2–14)
NEUTROPHILS # BLD AUTO: 10.97 K/UL — HIGH (ref 1.8–7.4)
NEUTROPHILS NFR BLD AUTO: 87 % — HIGH (ref 43–77)
NRBC # FLD: 0 K/UL — SIGNIFICANT CHANGE UP (ref 0–0)
PHOSPHATE SERPL-MCNC: 1.4 MG/DL — LOW (ref 2.5–4.5)
PLATELET # BLD AUTO: 191 K/UL — SIGNIFICANT CHANGE UP (ref 150–400)
PMV BLD: 11.1 FL — SIGNIFICANT CHANGE UP (ref 7–13)
POTASSIUM SERPL-MCNC: 3.6 MMOL/L — SIGNIFICANT CHANGE UP (ref 3.5–5.3)
POTASSIUM SERPL-SCNC: 3.6 MMOL/L — SIGNIFICANT CHANGE UP (ref 3.5–5.3)
RBC # BLD: 4.5 M/UL — SIGNIFICANT CHANGE UP (ref 4.2–5.8)
RBC # FLD: 12.4 % — SIGNIFICANT CHANGE UP (ref 10.3–14.5)
RSV RNA SPEC QL NAA+PROBE: NOT DETECTED — SIGNIFICANT CHANGE UP
RV+EV RNA SPEC QL NAA+PROBE: NOT DETECTED — SIGNIFICANT CHANGE UP
SODIUM SERPL-SCNC: 131 MMOL/L — LOW (ref 135–145)
SPECIMEN SOURCE: SIGNIFICANT CHANGE UP
WBC # BLD: 12.6 K/UL — HIGH (ref 3.8–10.5)
WBC # FLD AUTO: 12.6 K/UL — HIGH (ref 3.8–10.5)

## 2020-08-27 PROCEDURE — 99233 SBSQ HOSP IP/OBS HIGH 50: CPT | Mod: GC

## 2020-08-27 PROCEDURE — 71250 CT THORAX DX C-: CPT | Mod: 26

## 2020-08-27 RX ORDER — INSULIN GLARGINE 100 [IU]/ML
4 INJECTION, SOLUTION SUBCUTANEOUS AT BEDTIME
Refills: 0 | Status: DISCONTINUED | OUTPATIENT
Start: 2020-08-27 | End: 2020-08-28

## 2020-08-27 RX ORDER — SODIUM,POTASSIUM PHOSPHATES 278-250MG
1 POWDER IN PACKET (EA) ORAL
Refills: 0 | Status: COMPLETED | OUTPATIENT
Start: 2020-08-27 | End: 2020-08-28

## 2020-08-27 RX ORDER — INSULIN LISPRO 100/ML
1 VIAL (ML) SUBCUTANEOUS
Refills: 0 | Status: DISCONTINUED | OUTPATIENT
Start: 2020-08-27 | End: 2020-08-28

## 2020-08-27 RX ADMIN — Medication 1 UNIT(S): at 13:14

## 2020-08-27 RX ADMIN — ENOXAPARIN SODIUM 40 MILLIGRAM(S): 100 INJECTION SUBCUTANEOUS at 11:55

## 2020-08-27 RX ADMIN — CEFTRIAXONE 100 MILLIGRAM(S): 500 INJECTION, POWDER, FOR SOLUTION INTRAMUSCULAR; INTRAVENOUS at 03:34

## 2020-08-27 RX ADMIN — Medication 1: at 09:36

## 2020-08-27 RX ADMIN — Medication 650 MILLIGRAM(S): at 23:00

## 2020-08-27 RX ADMIN — Medication 650 MILLIGRAM(S): at 15:26

## 2020-08-27 RX ADMIN — Medication 2: at 18:12

## 2020-08-27 RX ADMIN — Medication 2: at 13:13

## 2020-08-27 RX ADMIN — Medication 650 MILLIGRAM(S): at 17:23

## 2020-08-27 RX ADMIN — Medication 650 MILLIGRAM(S): at 06:24

## 2020-08-27 RX ADMIN — INSULIN GLARGINE 4 UNIT(S): 100 INJECTION, SOLUTION SUBCUTANEOUS at 22:59

## 2020-08-27 RX ADMIN — Medication 1 UNIT(S): at 18:11

## 2020-08-27 RX ADMIN — Medication 650 MILLIGRAM(S): at 05:29

## 2020-08-27 RX ADMIN — Medication 1 TABLET(S): at 18:11

## 2020-08-27 RX ADMIN — AZITHROMYCIN 255 MILLIGRAM(S): 500 TABLET, FILM COATED ORAL at 11:54

## 2020-08-27 RX ADMIN — Medication 650 MILLIGRAM(S): at 23:35

## 2020-08-27 NOTE — PROGRESS NOTE ADULT - SUBJECTIVE AND OBJECTIVE BOX
PROGRESS NOTE:     CONTACT INFO:  Maeve Blackman MD | PGY-1  Pager: [phone] [place], [id number] [facility]  After 7pm page night float      Patient is a 50y old  Male who presents with a chief complaint of nausea, abdominal pain, fever (26 Aug 2020 09:33)      SUBJECTIVE / OVERNIGHT EVENTS:    - No acute events overnight.   - No fevers/chills.  - Patient seen and evaluated at bedside.  -Reports had a "soft" BM today. When examined in toilet, appeared to be diarrhea.    ADDITIONAL REVIEW OF SYSTEMS:    MEDICATIONS  (STANDING):  azithromycin  IVPB 500 milliGRAM(s) IV Intermittent daily  cefTRIAXone   IVPB 1000 milliGRAM(s) IV Intermittent every 24 hours  dextrose 5%. 1000 milliLiter(s) (50 mL/Hr) IV Continuous <Continuous>  dextrose 50% Injectable 12.5 Gram(s) IV Push once  dextrose 50% Injectable 25 Gram(s) IV Push once  dextrose 50% Injectable 25 Gram(s) IV Push once  enoxaparin Injectable 40 milliGRAM(s) SubCutaneous daily  insulin glargine Injectable (LANTUS) 4 Unit(s) SubCutaneous at bedtime  insulin lispro (HumaLOG) corrective regimen sliding scale   SubCutaneous three times a day before meals  insulin lispro (HumaLOG) corrective regimen sliding scale   SubCutaneous at bedtime  insulin lispro Injectable (HumaLOG) 1 Unit(s) SubCutaneous three times a day before meals  potassium phosphate / sodium phosphate Tablet (K-PHOS No. 2) 1 Tablet(s) Oral two times a day with meals  sodium chloride 0.9%. 1000 milliLiter(s) (100 mL/Hr) IV Continuous <Continuous>    MEDICATIONS  (PRN):  acetaminophen   Tablet .. 650 milliGRAM(s) Oral every 6 hours PRN Temp greater or equal to 38C (100.4F), Mild Pain (1 - 3), Moderate Pain (4 - 6)  dextrose 40% Gel 15 Gram(s) Oral once PRN Blood Glucose LESS THAN 70 milliGRAM(s)/deciliter  glucagon  Injectable 1 milliGRAM(s) IntraMuscular once PRN Glucose LESS THAN 70 milligrams/deciliter  senna 2 Tablet(s) Oral at bedtime PRN Constipation      CAPILLARY BLOOD GLUCOSE      POCT Blood Glucose.: 225 mg/dL (27 Aug 2020 12:24)  POCT Blood Glucose.: 198 mg/dL (27 Aug 2020 09:07)  POCT Blood Glucose.: 215 mg/dL (26 Aug 2020 21:51)  POCT Blood Glucose.: 231 mg/dL (26 Aug 2020 17:47)    I&O's Summary    26 Aug 2020 07:01  -  27 Aug 2020 07:00  --------------------------------------------------------  IN: 0 mL / OUT: 400 mL / NET: -400 mL        PHYSICAL EXAM:  Vital Signs Last 24 Hrs  T(C): 37.1 (27 Aug 2020 11:34), Max: 39.5 (26 Aug 2020 15:27)  T(F): 98.8 (27 Aug 2020 11:34), Max: 103.1 (26 Aug 2020 15:27)  HR: 86 (27 Aug 2020 11:34) (86 - 106)  BP: 145/63 (27 Aug 2020 11:34) (140/59 - 147/63)  BP(mean): --  RR: 18 (27 Aug 2020 11:34) (18 - 20)  SpO2: 100% (27 Aug 2020 11:34) (100% - 100%)    CONSTITUTIONAL: NAD, well-developed  RESPIRATORY: Normal respiratory effort; lungs are clear to auscultation bilaterally  CARDIOVASCULAR: Regular rate and rhythm, normal S1 and S2, no murmur/rub/gallop; No lower extremity edema; Peripheral pulses are 2+ bilaterally  ABDOMEN: Nontender to palpation, normoactive bowel sounds, no rebound/guarding; No hepatosplenomegaly  MUSCLOSKELETAL: no clubbing or cyanosis of digits; no joint swelling or tenderness to palpation  PSYCH: A+O to person, place, and time; affect appropriate    LABS:                        13.3   12.60 )-----------( 191      ( 27 Aug 2020 05:15 )             39.1     08    131<L>  |  95<L>  |  9   ----------------------------<  212<H>  3.6   |  22  |  0.62    Ca    8.3<L>      27 Aug 2020 05:15  Phos  1.4     -  Mg     1.9         TPro  6.9  /  Alb  4.0  /  TBili  0.7  /  DBili  x   /  AST  15  /  ALT  14  /  AlkPhos  70  -    PT/INR - ( 25 Aug 2020 19:00 )   PT: 15.9 SEC;   INR: 1.41          PTT - ( 25 Aug 2020 19:00 )  PTT:32.8 SEC      Urinalysis Basic - ( 25 Aug 2020 20:29 )    Color: LIGHT YELLOW / Appearance: CLEAR / S.015 / pH: 6.0  Gluc: >1000 / Ketone: MODERATE  / Bili: NEGATIVE / Urobili: NORMAL   Blood: NEGATIVE / Protein: 50 / Nitrite: NEGATIVE   Leuk Esterase: NEGATIVE / RBC: 0-2 / WBC 3-5   Sq Epi: OCC / Non Sq Epi: x / Bacteria: NEGATIVE        Culture - Blood (collected 26 Aug 2020 02:52)  Source: .Blood Blood-Peripheral  Preliminary Report (27 Aug 2020 03:01):    No growth to date.    Culture - Blood (collected 26 Aug 2020 02:52)  Source: .Blood Blood-Peripheral  Preliminary Report (27 Aug 2020 03:01):    No growth to date.        RADIOLOGY & ADDITIONAL TESTS:  Results Reviewed:   Imaging Personally Reviewed:  Electrocardiogram Personally Reviewed:    COORDINATION OF CARE:  Care Discussed with Consultants/Other Providers [Y/N]: Y  Prior or Outpatient Records Reviewed [Y/N]: Y

## 2020-08-27 NOTE — PROVIDER CONTACT NOTE (OTHER) - ASSESSMENT
Visit Information    Have you changed or started any medications since your last visit including any over-the-counter medicines, vitamins, or herbal medicines? no   Have you stopped taking any of your medications? Is so, why? -  no  Are you having any side effects from any of your medications? - no    Have you [facility] any [facility] or [facility] your last visit? [facility]   Have you had any other diagnostic tests since your last visit?  no   Have you been seen in the emergency room and/or had an admission in a hospital since we last saw you?  [facility]   Have you had your routine dental cleaning in the past 6 months?  no     Do you have an active MyChart account? If no, what is the barrier?   Yes    Patient Care Team:  CESAR Escobar CNP as PCP - General (Family Medicine)  CESAR Escobar CNP as PCP - [facility]  Daria Oneil MD as Consulting Physician (Gastroenterology)  Patricia Heart MD as Consulting Physician (Hematology and Oncology)  Jaqueline Carbajal RN as Care Transitions Nurse  Yaron Causey RN as Care Transitions Nurse  Rancho Villafana RN as Care Transitions Nurse    Medical History Review  Past Medical, Family, and Social History reviewed and does not contribute to the patient presenting condition    Health Maintenance   Topic Date Due    AAA screen  1955    Hepatitis C screen  1955    HIV screen  04/04/1969    Low dose CT lung screening  04/04/2009    Colon cancer screen colonoscopy  03/10/2019    Annual Wellness Visit (AWV)  06/23/2019    Pneumococcal 65+ years Vaccine (1 of 1 - PPSV23) 02/29/2020 (Originally 4/4/2019)    Shingles Vaccine (1 of 2) 08/30/2020 (Originally 4/4/2004)    Flu vaccine (1) 01/06/2021 (Originally 9/1/2019)    A1C test (Diabetic or Prediabetic)  01/26/2021    Lipid screen  01/26/2021    Potassium monitoring  01/27/2021    Creatinine monitoring  01/27/2021    DTaP/Tdap/Td vaccine (2 - Td) patient asymptomatic, no acute distress noted

## 2020-08-27 NOTE — PROGRESS NOTE ADULT - PROBLEM SELECTOR PLAN 1
febrile, tachycardia, leukocytosis, CT A/P  showing evidence of PNA RLL, however no cough, or SOB, unclear source as pt continues to have fevers today  -abx as below  -F/u cultures

## 2020-08-27 NOTE — PROGRESS NOTE ADULT - PROBLEM SELECTOR PLAN 2
CAP; Given one dose of zosyn  - Blood culture  - sputum culture    - urine legionella   - CTX and Azithromycin  - trend fever curve, WBC  - f/u covid result, although lower suspicion  - encourage PO hydration  - trend procalcitonin CAP; Given one dose of zosyn, then on ceftriaxone + azithro. Bcx NGTD. Not producing sputum, U legionella negative  -CT chest non con showing: increase in RLL consolidation, 4mm RUL pulm nodule, 3mm MADHAV pulm nodule, LLL lingula calcified granuloma  - CTX and Azithromycin  - trend fever curve, WBC  -will need follow up outpt 1 mo for resolution of PNA
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
## 2020-08-27 NOTE — PROGRESS NOTE ADULT - ATTENDING COMMENTS
Pt feeling somewhat better, fever curve downtrending, cultures negative. Continue empiric abx and f/u imaging for possible PNA, and check stool studies given diarrhea.

## 2020-08-28 LAB
ANION GAP SERPL CALC-SCNC: 12 MMO/L — SIGNIFICANT CHANGE UP (ref 7–14)
BASOPHILS # BLD AUTO: 0.03 K/UL — SIGNIFICANT CHANGE UP (ref 0–0.2)
BASOPHILS NFR BLD AUTO: 0.4 % — SIGNIFICANT CHANGE UP (ref 0–2)
BUN SERPL-MCNC: 10 MG/DL — SIGNIFICANT CHANGE UP (ref 7–23)
CALCIUM SERPL-MCNC: 8.2 MG/DL — LOW (ref 8.4–10.5)
CHLORIDE SERPL-SCNC: 101 MMOL/L — SIGNIFICANT CHANGE UP (ref 98–107)
CO2 SERPL-SCNC: 23 MMOL/L — SIGNIFICANT CHANGE UP (ref 22–31)
CREAT SERPL-MCNC: 0.59 MG/DL — SIGNIFICANT CHANGE UP (ref 0.5–1.3)
EOSINOPHIL # BLD AUTO: 0.01 K/UL — SIGNIFICANT CHANGE UP (ref 0–0.5)
EOSINOPHIL NFR BLD AUTO: 0.1 % — SIGNIFICANT CHANGE UP (ref 0–6)
GLUCOSE BLDC GLUCOMTR-MCNC: 136 MG/DL — HIGH (ref 70–99)
GLUCOSE BLDC GLUCOMTR-MCNC: 192 MG/DL — HIGH (ref 70–99)
GLUCOSE BLDC GLUCOMTR-MCNC: 205 MG/DL — HIGH (ref 70–99)
GLUCOSE BLDC GLUCOMTR-MCNC: 268 MG/DL — HIGH (ref 70–99)
GLUCOSE SERPL-MCNC: 195 MG/DL — HIGH (ref 70–99)
HCT VFR BLD CALC: 37 % — LOW (ref 39–50)
HCT VFR BLD CALC: 37 % — LOW (ref 39–50)
HGB BLD-MCNC: 12.3 G/DL — LOW (ref 13–17)
HGB BLD-MCNC: 12.3 G/DL — LOW (ref 13–17)
IMM GRANULOCYTES NFR BLD AUTO: 0.4 % — SIGNIFICANT CHANGE UP (ref 0–1.5)
LYMPHOCYTES # BLD AUTO: 0.88 K/UL — LOW (ref 1–3.3)
LYMPHOCYTES # BLD AUTO: 11.3 % — LOW (ref 13–44)
MAGNESIUM SERPL-MCNC: 2.1 MG/DL — SIGNIFICANT CHANGE UP (ref 1.6–2.6)
MCHC RBC-ENTMCNC: 29.1 PG — SIGNIFICANT CHANGE UP (ref 27–34)
MCHC RBC-ENTMCNC: 29.1 PG — SIGNIFICANT CHANGE UP (ref 27–34)
MCHC RBC-ENTMCNC: 33.2 % — SIGNIFICANT CHANGE UP (ref 32–36)
MCHC RBC-ENTMCNC: 33.2 % — SIGNIFICANT CHANGE UP (ref 32–36)
MCV RBC AUTO: 87.5 FL — SIGNIFICANT CHANGE UP (ref 80–100)
MCV RBC AUTO: 87.5 FL — SIGNIFICANT CHANGE UP (ref 80–100)
MONOCYTES # BLD AUTO: 0.82 K/UL — SIGNIFICANT CHANGE UP (ref 0–0.9)
MONOCYTES NFR BLD AUTO: 10.6 % — SIGNIFICANT CHANGE UP (ref 2–14)
NEUTROPHILS # BLD AUTO: 6 K/UL — SIGNIFICANT CHANGE UP (ref 1.8–7.4)
NEUTROPHILS NFR BLD AUTO: 77.2 % — HIGH (ref 43–77)
NRBC # FLD: 0 K/UL — SIGNIFICANT CHANGE UP (ref 0–0)
NRBC # FLD: 0 K/UL — SIGNIFICANT CHANGE UP (ref 0–0)
PHOSPHATE SERPL-MCNC: 1.9 MG/DL — LOW (ref 2.5–4.5)
PLATELET # BLD AUTO: 207 K/UL — SIGNIFICANT CHANGE UP (ref 150–400)
PLATELET # BLD AUTO: 207 K/UL — SIGNIFICANT CHANGE UP (ref 150–400)
PMV BLD: 11.3 FL — SIGNIFICANT CHANGE UP (ref 7–13)
PMV BLD: 11.3 FL — SIGNIFICANT CHANGE UP (ref 7–13)
POTASSIUM SERPL-MCNC: 3.5 MMOL/L — SIGNIFICANT CHANGE UP (ref 3.5–5.3)
POTASSIUM SERPL-SCNC: 3.5 MMOL/L — SIGNIFICANT CHANGE UP (ref 3.5–5.3)
RBC # BLD: 4.23 M/UL — SIGNIFICANT CHANGE UP (ref 4.2–5.8)
RBC # BLD: 4.23 M/UL — SIGNIFICANT CHANGE UP (ref 4.2–5.8)
RBC # FLD: 12.8 % — SIGNIFICANT CHANGE UP (ref 10.3–14.5)
RBC # FLD: 12.8 % — SIGNIFICANT CHANGE UP (ref 10.3–14.5)
SODIUM SERPL-SCNC: 136 MMOL/L — SIGNIFICANT CHANGE UP (ref 135–145)
WBC # BLD: 8.05 K/UL — SIGNIFICANT CHANGE UP (ref 3.8–10.5)
WBC # BLD: 8.05 K/UL — SIGNIFICANT CHANGE UP (ref 3.8–10.5)
WBC # FLD AUTO: 8.05 K/UL — SIGNIFICANT CHANGE UP (ref 3.8–10.5)
WBC # FLD AUTO: 8.05 K/UL — SIGNIFICANT CHANGE UP (ref 3.8–10.5)

## 2020-08-28 PROCEDURE — 99233 SBSQ HOSP IP/OBS HIGH 50: CPT | Mod: GC

## 2020-08-28 RX ORDER — INSULIN LISPRO 100/ML
3 VIAL (ML) SUBCUTANEOUS
Refills: 0 | Status: DISCONTINUED | OUTPATIENT
Start: 2020-08-28 | End: 2020-08-29

## 2020-08-28 RX ORDER — INSULIN GLARGINE 100 [IU]/ML
8 INJECTION, SOLUTION SUBCUTANEOUS AT BEDTIME
Refills: 0 | Status: DISCONTINUED | OUTPATIENT
Start: 2020-08-28 | End: 2020-08-30

## 2020-08-28 RX ADMIN — Medication 1 TABLET(S): at 12:38

## 2020-08-28 RX ADMIN — Medication 3: at 17:59

## 2020-08-28 RX ADMIN — INSULIN GLARGINE 8 UNIT(S): 100 INJECTION, SOLUTION SUBCUTANEOUS at 22:11

## 2020-08-28 RX ADMIN — Medication 2: at 12:38

## 2020-08-28 RX ADMIN — Medication 1 UNIT(S): at 09:00

## 2020-08-28 RX ADMIN — Medication 1 UNIT(S): at 12:40

## 2020-08-28 RX ADMIN — Medication 650 MILLIGRAM(S): at 15:54

## 2020-08-28 RX ADMIN — SODIUM CHLORIDE 100 MILLILITER(S): 9 INJECTION INTRAMUSCULAR; INTRAVENOUS; SUBCUTANEOUS at 15:53

## 2020-08-28 RX ADMIN — SODIUM CHLORIDE 100 MILLILITER(S): 9 INJECTION INTRAMUSCULAR; INTRAVENOUS; SUBCUTANEOUS at 07:23

## 2020-08-28 RX ADMIN — Medication 3 UNIT(S): at 17:58

## 2020-08-28 RX ADMIN — Medication 2: at 12:39

## 2020-08-28 RX ADMIN — SODIUM CHLORIDE 100 MILLILITER(S): 9 INJECTION INTRAMUSCULAR; INTRAVENOUS; SUBCUTANEOUS at 05:44

## 2020-08-28 RX ADMIN — CEFTRIAXONE 100 MILLIGRAM(S): 500 INJECTION, POWDER, FOR SOLUTION INTRAMUSCULAR; INTRAVENOUS at 03:46
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
## 2020-08-28 NOTE — PROGRESS NOTE ADULT - ATTENDING COMMENTS
Pt subjectively improved despite fever. Leukocytosis resolved. CT demonstrates consolidation consistent with PNA. Continue ceftriaxone, follow cultures and fever curve.

## 2020-08-28 NOTE — DISCHARGE NOTE PROVIDER - NSDCCPCAREPLAN_GEN_ALL_CORE_FT
PRINCIPAL DISCHARGE DIAGNOSIS  Diagnosis: Sepsis  Assessment and Plan of Treatment: You came in with fever, nausea and abdominal pain. You were found to  be in sepsis. Sepsis is a potentially life-threatening condition caused by the body's response to an infection. The body normally releases chemicals into the bloodstream to fight an infection. Sepsis occurs when the body's response to these chemicals is out of balance, triggering changes that can damage multiple organ systems. You were treated with antibiotics and fluid and improved.      SECONDARY DISCHARGE DIAGNOSES  Diagnosis: Pneumonia of right lower lobe due to infectious organism  Assessment and Plan of Treatment: You came in with fatigue. On imaging you were found to have pneumonia. Pneumonia is an infection that inflames the air sacs in one or both lungs. The air sacs may fill with fluid or pus (purulent material), causing cough with phlegm or pus, fever, chills, and difficulty breathing. A variety of organisms, including bacteria, viruses and fungi, can cause pneumonia. You were treated with antibiotics and improved. You need a CT chest in 1 month to make sure the pneumonia has resolved.    Diagnosis: Lung nodule  Assessment and Plan of Treatment: On CT chest of your lungs you were found to have 2 pulmonary nodules. Follow up with your primary care doctor.    Diagnosis: Diabetes mellitus type 2, noninsulin dependent  Assessment and Plan of Treatment: Diabetes mellitus type 2, noninsulin dependent PRINCIPAL DISCHARGE DIAGNOSIS  Diagnosis: Sepsis  Assessment and Plan of Treatment: You came in with fever, nausea and abdominal pain. You were found to  be in sepsis. Sepsis is a potentially life-threatening condition caused by the body's response to an infection. The body normally releases chemicals into the bloodstream to fight an infection. Sepsis occurs when the body's response to these chemicals is out of balance, triggering changes that can damage multiple organ systems. You were treated with antibiotics and fluid and improved.      SECONDARY DISCHARGE DIAGNOSES  Diagnosis: Pneumonia of right lower lobe due to infectious organism  Assessment and Plan of Treatment: You came in with fatigue. On imaging you were found to have pneumonia. Pneumonia is an infection that inflames the air sacs in one or both lungs. The air sacs may fill with fluid or pus (purulent material), causing cough with phlegm or pus, fever, chills, and difficulty breathing. A variety of organisms, including bacteria, viruses and fungi, can cause pneumonia. You were treated with antibiotics and improved. You need a CT chest in 6 weeks to make sure the pneumonia has resolved. Follow up with your primary care.    Diagnosis: Lung nodule  Assessment and Plan of Treatment: On CT chest of your lungs you were found to have 2 pulmonary nodules. Follow up with your primary care doctor. You will need a repeat CT chest in 1 year.    Diagnosis: Diabetes mellitus type 2, noninsulin dependent  Assessment and Plan of Treatment: Diabetes mellitus type 2, noninsulin dependent  You have a history of diabetes, your Hgb a1c was 9.2%, which indicates that the diabetes control is sub-optimal. Endocrinology was called.  You will be sent home on metformin 1 g BID and amaryl 2mg with breakfast. You can follow up with the  [address] [phone].

## 2020-08-28 NOTE — DISCHARGE NOTE PROVIDER - NSDCMRMEDTOKEN_GEN_ALL_CORE_FT
metFORMIN 750 mg oral tablet, extended release: 1 tab(s) orally 2 times a day Basaglar KwikPen 100 units/mL subcutaneous solution: 8 unit(s) subcutaneous once a day (at bedtime)   Insulin Lispro KwikPen 100 units/mL injectable solution: 3 unit(s) injectable 2 times a day (before meals)   metFORMIN 750 mg oral tablet, extended release: 1 tab(s) orally 2 times a day Amaryl 2 mg oral tablet: 1 tab(s) orally once a day (with breakfast)   MetFORMIN (Eqv-Glumetza) 1000 mg oral tablet, extended release: 1 tab(s) orally 2 times a day

## 2020-08-28 NOTE — CHART NOTE - NSCHARTNOTEFT_GEN_A_CORE
Endocrine consulted for patient.  Chart reviewed    In Brief:  51 yo male with PMhx of T2DM (on Metformin) currently being treated for underlying pneumonia. HbA1c ~9.  BG levels ~200s  GFR wnl  Weight 79 kg    Recommendations:  - Lantus 8 units qhs  - Humalog 3 units AC meals  - Low dose correctional scale AC meals and qhs    Discussed with primary team  Full Endocrine consult to follow in the morning
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
## 2020-08-28 NOTE — DISCHARGE NOTE PROVIDER - NSDCFUADDINST_GEN_ALL_CORE_FT
Patient is s/p one cycle of cisplatin/alimta and tolerated this with expected tox.  Her renal function is good and will plan to continue with cycle 2 on the June 26th. Plan is to compete four of these treatments.     [facility]  [address] [facility], [address]  [place] [address]  Phone: [phone]    This is the number for the resident clinic. You can call to make an appointment to follow up. Dr. Colt Crawley, and Dr. Maeve Blackman took care of you while in the hospital, and would be happy to see you in the clinic. Regardless of who you see, please follow up with a primary care doctor.

## 2020-08-28 NOTE — PROGRESS NOTE ADULT - PROBLEM SELECTOR PLAN 4
Insulin naive, a1c 9.2%, was on metformin only at home  -start basal bolus  -ISS  - fsg before meals and bedtime Insulin naive, a1c 9.2%, was on metformin only at home  -basal bolus  -ISS  -endo consult  - fsg before meals and bedtime

## 2020-08-28 NOTE — PROGRESS NOTE ADULT - PROBLEM SELECTOR PLAN 1
febrile, tachycardia, leukocytosis, CT A/P  showing evidence of PNA RLL, however no cough, or SOB, unclear source as pt continues to have fevers today  -abx as below  -F/u cultures febrile, tachycardia, leukocytosis, imaging showing RLL PNA  -CTX, hold off restarting azithro  -F/u cultures

## 2020-08-28 NOTE — DISCHARGE NOTE PROVIDER - NSFOLLOWUPCLINICS_GEN_ALL_ED_FT
[facility]  Endocrinology  [address] [place] [address]  Phone: [phone]  Fax:   Follow Up Time: 1 week

## 2020-08-28 NOTE — PROGRESS NOTE ADULT - SUBJECTIVE AND OBJECTIVE BOX
PROGRESS NOTE:     CONTACT INFO:  Maeve Blackman MD | PGY-1  Pager: [phone] [place], [id number] [facility]  After 7pm page night float      Patient is a 50y old  Male who presents with a chief complaint of nausea, abdominal pain, fever (26 Aug 2020 09:33)      SUBJECTIVE / OVERNIGHT EVENTS:    - No acute events overnight.   - No fevers/chills.  - Patient seen and evaluated at bedside.  -Reports had a "soft" BM today. When examined in toilet, appeared to be diarrhea.    ADDITIONAL REVIEW OF SYSTEMS:    MEDICATIONS  (STANDING):  azithromycin  IVPB 500 milliGRAM(s) IV Intermittent daily  cefTRIAXone   IVPB 1000 milliGRAM(s) IV Intermittent every 24 hours  dextrose 5%. 1000 milliLiter(s) (50 mL/Hr) IV Continuous <Continuous>  dextrose 50% Injectable 12.5 Gram(s) IV Push once  dextrose 50% Injectable 25 Gram(s) IV Push once  dextrose 50% Injectable 25 Gram(s) IV Push once  enoxaparin Injectable 40 milliGRAM(s) SubCutaneous daily  insulin glargine Injectable (LANTUS) 4 Unit(s) SubCutaneous at bedtime  insulin lispro (HumaLOG) corrective regimen sliding scale   SubCutaneous three times a day before meals  insulin lispro (HumaLOG) corrective regimen sliding scale   SubCutaneous at bedtime  insulin lispro Injectable (HumaLOG) 1 Unit(s) SubCutaneous three times a day before meals  potassium phosphate / sodium phosphate Tablet (K-PHOS No. 2) 1 Tablet(s) Oral two times a day with meals  sodium chloride 0.9%. 1000 milliLiter(s) (100 mL/Hr) IV Continuous <Continuous>    MEDICATIONS  (PRN):  acetaminophen   Tablet .. 650 milliGRAM(s) Oral every 6 hours PRN Temp greater or equal to 38C (100.4F), Mild Pain (1 - 3), Moderate Pain (4 - 6)  dextrose 40% Gel 15 Gram(s) Oral once PRN Blood Glucose LESS THAN 70 milliGRAM(s)/deciliter  glucagon  Injectable 1 milliGRAM(s) IntraMuscular once PRN Glucose LESS THAN 70 milligrams/deciliter  senna 2 Tablet(s) Oral at bedtime PRN Constipation      CAPILLARY BLOOD GLUCOSE      POCT Blood Glucose.: 225 mg/dL (27 Aug 2020 12:24)  POCT Blood Glucose.: 198 mg/dL (27 Aug 2020 09:07)  POCT Blood Glucose.: 215 mg/dL (26 Aug 2020 21:51)  POCT Blood Glucose.: 231 mg/dL (26 Aug 2020 17:47)    I&O's Summary    26 Aug 2020 07:01  -  27 Aug 2020 07:00  --------------------------------------------------------  IN: 0 mL / OUT: 400 mL / NET: -400 mL        PHYSICAL EXAM:  Vital Signs Last 24 Hrs  T(C): 37.1 (27 Aug 2020 11:34), Max: 39.5 (26 Aug 2020 15:27)  T(F): 98.8 (27 Aug 2020 11:34), Max: 103.1 (26 Aug 2020 15:27)  HR: 86 (27 Aug 2020 11:34) (86 - 106)  BP: 145/63 (27 Aug 2020 11:34) (140/59 - 147/63)  BP(mean): --  RR: 18 (27 Aug 2020 11:34) (18 - 20)  SpO2: 100% (27 Aug 2020 11:34) (100% - 100%)    CONSTITUTIONAL: NAD, well-developed  RESPIRATORY: Normal respiratory effort; lungs are clear to auscultation bilaterally  CARDIOVASCULAR: Regular rate and rhythm, normal S1 and S2, no murmur/rub/gallop; No lower extremity edema; Peripheral pulses are 2+ bilaterally  ABDOMEN: Nontender to palpation, normoactive bowel sounds, no rebound/guarding; No hepatosplenomegaly  MUSCLOSKELETAL: no clubbing or cyanosis of digits; no joint swelling or tenderness to palpation  PSYCH: A+O to person, place, and time; affect appropriate    LABS:                        13.3   12.60 )-----------( 191      ( 27 Aug 2020 05:15 )             39.1     08    131<L>  |  95<L>  |  9   ----------------------------<  212<H>  3.6   |  22  |  0.62    Ca    8.3<L>      27 Aug 2020 05:15  Phos  1.4     -  Mg     1.9         TPro  6.9  /  Alb  4.0  /  TBili  0.7  /  DBili  x   /  AST  15  /  ALT  14  /  AlkPhos  70  -    PT/INR - ( 25 Aug 2020 19:00 )   PT: 15.9 SEC;   INR: 1.41          PTT - ( 25 Aug 2020 19:00 )  PTT:32.8 SEC      Urinalysis Basic - ( 25 Aug 2020 20:29 )    Color: LIGHT YELLOW / Appearance: CLEAR / S.015 / pH: 6.0  Gluc: >1000 / Ketone: MODERATE  / Bili: NEGATIVE / Urobili: NORMAL   Blood: NEGATIVE / Protein: 50 / Nitrite: NEGATIVE   Leuk Esterase: NEGATIVE / RBC: 0-2 / WBC 3-5   Sq Epi: OCC / Non Sq Epi: x / Bacteria: NEGATIVE        Culture - Blood (collected 26 Aug 2020 02:52)  Source: .Blood Blood-Peripheral  Preliminary Report (27 Aug 2020 03:01):    No growth to date.    Culture - Blood (collected 26 Aug 2020 02:52)  Source: .Blood Blood-Peripheral  Preliminary Report (27 Aug 2020 03:01):    No growth to date.        RADIOLOGY & ADDITIONAL TESTS:  Results Reviewed:   Imaging Personally Reviewed:  Electrocardiogram Personally Reviewed:    COORDINATION OF CARE:  Care Discussed with Consultants/Other Providers [Y/N]: Y  Prior or Outpatient Records Reviewed [Y/N]: Y PROGRESS NOTE:     CONTACT INFO:  Maeve Blackman MD | PGY-1  Pager: [phone] [place], [address] [facility]  After 7pm page night float      Patient is a 50y old  Male who presents with a chief complaint of nausea, abdominal pain, fever (26 Aug 2020 09:33)      SUBJECTIVE / OVERNIGHT EVENTS:    - Had a fever of 102.5 last night. Was given tylenol and resolved.  - Patient seen and evaluated at bedside.  - Reports three bowel movements yesterday - "oatmeal" consistency.    ADDITIONAL REVIEW OF SYSTEMS:    MEDICATIONS  (STANDING):  azithromycin  IVPB 500 milliGRAM(s) IV Intermittent daily  cefTRIAXone   IVPB 1000 milliGRAM(s) IV Intermittent every 24 hours  dextrose 5%. 1000 milliLiter(s) (50 mL/Hr) IV Continuous <Continuous>  dextrose 50% Injectable 12.5 Gram(s) IV Push once  dextrose 50% Injectable 25 Gram(s) IV Push once  dextrose 50% Injectable 25 Gram(s) IV Push once  enoxaparin Injectable 40 milliGRAM(s) SubCutaneous daily  insulin glargine Injectable (LANTUS) 4 Unit(s) SubCutaneous at bedtime  insulin lispro (HumaLOG) corrective regimen sliding scale   SubCutaneous three times a day before meals  insulin lispro (HumaLOG) corrective regimen sliding scale   SubCutaneous at bedtime  insulin lispro Injectable (HumaLOG) 1 Unit(s) SubCutaneous three times a day before meals  potassium phosphate / sodium phosphate Tablet (K-PHOS No. 2) 1 Tablet(s) Oral two times a day with meals  sodium chloride 0.9%. 1000 milliLiter(s) (100 mL/Hr) IV Continuous <Continuous>    MEDICATIONS  (PRN):  acetaminophen   Tablet .. 650 milliGRAM(s) Oral every 6 hours PRN Temp greater or equal to 38C (100.4F), Mild Pain (1 - 3), Moderate Pain (4 - 6)  dextrose 40% Gel 15 Gram(s) Oral once PRN Blood Glucose LESS THAN 70 milliGRAM(s)/deciliter  glucagon  Injectable 1 milliGRAM(s) IntraMuscular once PRN Glucose LESS THAN 70 milligrams/deciliter  senna 2 Tablet(s) Oral at bedtime PRN Constipation    CAPILLARY BLOOD GLUCOSE    POCT Blood Glucose.: 192 mg/dL (28 Aug 2020 09:04)  POCT Blood Glucose.: 226 mg/dL (27 Aug 2020 22:12)  POCT Blood Glucose.: 209 mg/dL (27 Aug 2020 17:58)  POCT Blood Glucose.: 225 mg/dL (27 Aug 2020 12:24)    I&O's Summary    27 Aug 2020 07:01  -  28 Aug 2020 07:00  --------------------------------------------------------  IN: 2100 mL / OUT: 700 mL / NET: 1400 mL    PHYSICAL EXAM:  Vital Signs Last 24 Hrs  T(C): 37.7 (28 Aug 2020 05:36), Max: 39.2 (27 Aug 2020 21:47)  T(F): 99.8 (28 Aug 2020 05:36), Max: 102.5 (27 Aug 2020 21:47)  HR: 84 (28 Aug 2020 05:36) (79 - 102)  BP: 138/70 (28 Aug 2020 05:36) (114/66 - 148/71)  RR: 18 (28 Aug 2020 05:36) (17 - 18)  SpO2: 98% (28 Aug 2020 05:36) (98% - 100%)    CONSTITUTIONAL: NAD, well-developed  RESPIRATORY: Normal respiratory effort; lungs are clear to auscultation bilaterally  CARDIOVASCULAR: Regular rate and rhythm, normal S1 and S2, no murmur/rub/gallop; No lower extremity edema; Peripheral pulses are 2+ bilaterally  ABDOMEN: Nontender to palpation, normoactive bowel sounds, no rebound/guarding; No hepatosplenomegaly  MUSCLOSKELETAL: no clubbing or cyanosis of digits; no joint swelling or tenderness to palpation  PSYCH: A+O to person, place, and time; affect appropriate    LABS:                        12.3   8.05  )-----------( 207      ( 28 Aug 2020 06:45 )             37.0     08-27    131<L>  |  95<L>  |  9   ----------------------------<  212<H>  3.6   |  22  |  0.62    Ca    8.3<L>      27 Aug 2020 05:15  Phos  1.4     08-27  Mg     1.9     08-27    TPro  6.9  /  Alb  4.0  /  TBili  0.7  /  DBili  x   /  AST  15  /  ALT  14  /  AlkPhos  70      Urinalysis Basic - ( 25 Aug 2020 20:29 )    Color: LIGHT YELLOW / Appearance: CLEAR / S.015 / pH: 6.0  Gluc: >1000 / Ketone: MODERATE  / Bili: NEGATIVE / Urobili: NORMAL   Blood: NEGATIVE / Protein: 50 / Nitrite: NEGATIVE   Leuk Esterase: NEGATIVE / RBC: 0-2 / WBC 3-5   Sq Epi: OCC / Non Sq Epi: x / Bacteria: NEGATIVE    Culture - Blood (collected 26 Aug 2020 02:52)  Source: .Blood Blood-Peripheral  Preliminary Report (27 Aug 2020 03:01):    No growth to date.    Culture - Blood (collected 26 Aug 2020 02:52)  Source: .Blood Blood-Peripheral  Preliminary Report (27 Aug 2020 03:01):    No growth to date.    RADIOLOGY & ADDITIONAL TESTS:  < from: CT Chest No Cont (20 @ 10:38) >  FINDINGS:    LUNGS AND AIRWAYS: No endobronchial lesions. Interval increase in right lower lobe paraspinal/paramediastinal consolidation containing air bronchograms since 2020. 4 mm right upper lobe pulmonary nodule (2:47). 3 mm left upper lobe pulmonary nodule (2:45). Left lower lobe and lingula calcified granulomas.    PLEURA: No pleural effusion.    MEDIASTINUM AND HUMA: 1.5 x 1.0 cm right hilar and 2.4 x 0.9 cm subcarinal mildly enlarged lymph nodes, likely reactive.    VESSELS: Within normal limits.    HEART: Heart size is normal. No pericardial effusion.    CHEST WALL AND LOWER NECK: Within normal limits.    VISUALIZED UPPER ABDOMEN: Cholecystectomy.    BONES: Degenerative changes of spine with partial fusion of some of the midthoracic vertebral bodies.    IMPRESSION:    Since 2020, interval increase in right lower lobe consolidation representing pneumonia. A 1 month follow-up CT chest is recommended to evaluate for resolution/change.    < end of copied text >      COORDINATION OF CARE:  Care Discussed with Consultants/Other Providers [Y/N]: Y  Prior or Outpatient Records Reviewed [Y/N]: Y

## 2020-08-28 NOTE — PROGRESS NOTE ADULT - PROBLEM SELECTOR PLAN 2
CAP; Given one dose of zosyn, then on ceftriaxone + azithro. Bcx NGTD. Not producing sputum, U legionella negative  -CT chest non con showing: increase in RLL consolidation, 4mm RUL pulm nodule, 3mm MADHAV pulm nodule, LLL lingula calcified granuloma  - CTX and Azithromycin  - trend fever curve, WBC  -will need follow up outpt 1 mo for resolution of PNA CAP; Given one dose of zosyn, then on ceftriaxone + azithro. Bcx NGTD. Not producing sputum, U legionella negative, RVP negative; leukocytosis improving  -CT chest non con showing: increase in RLL consolidation, 4mm RUL pulm nodule, 3mm MADHAV pulm nodule, LLL lingula calcified granuloma  - CTX  - Discontinued Azithromycin 8/27  - trend fever curve, WBC  -will need follow up outpt 1 mo for resolution of PNA  -recultured 8/27 night for fever

## 2020-08-28 NOTE — DISCHARGE NOTE PROVIDER - NSDCCAREPROVSEEN_GEN_ALL_CORE_FT
[facility] Medicine, HS 5  [facility] Endocrinology, Team: Dr. Lennon, Dr. Perico Perry, Marc Carter, Kianna Crawley, Maeve Uribe

## 2020-08-29 DIAGNOSIS — I10 ESSENTIAL (PRIMARY) HYPERTENSION: ICD-10-CM

## 2020-08-29 DIAGNOSIS — E78.49 OTHER HYPERLIPIDEMIA: ICD-10-CM

## 2020-08-29 DIAGNOSIS — E11.9 TYPE 2 DIABETES MELLITUS WITHOUT COMPLICATIONS: ICD-10-CM

## 2020-08-29 LAB
ANION GAP SERPL CALC-SCNC: 12 MMO/L — SIGNIFICANT CHANGE UP (ref 7–14)
BASOPHILS # BLD AUTO: 0.04 K/UL — SIGNIFICANT CHANGE UP (ref 0–0.2)
BASOPHILS NFR BLD AUTO: 0.6 % — SIGNIFICANT CHANGE UP (ref 0–2)
BUN SERPL-MCNC: 10 MG/DL — SIGNIFICANT CHANGE UP (ref 7–23)
CALCIUM SERPL-MCNC: 8.1 MG/DL — LOW (ref 8.4–10.5)
CHLORIDE SERPL-SCNC: 102 MMOL/L — SIGNIFICANT CHANGE UP (ref 98–107)
CO2 SERPL-SCNC: 22 MMOL/L — SIGNIFICANT CHANGE UP (ref 22–31)
CREAT SERPL-MCNC: 0.64 MG/DL — SIGNIFICANT CHANGE UP (ref 0.5–1.3)
EOSINOPHIL # BLD AUTO: 0.03 K/UL — SIGNIFICANT CHANGE UP (ref 0–0.5)
EOSINOPHIL NFR BLD AUTO: 0.5 % — SIGNIFICANT CHANGE UP (ref 0–6)
GLUCOSE BLDC GLUCOMTR-MCNC: 166 MG/DL — HIGH (ref 70–99)
GLUCOSE BLDC GLUCOMTR-MCNC: 196 MG/DL — HIGH (ref 70–99)
GLUCOSE BLDC GLUCOMTR-MCNC: 199 MG/DL — HIGH (ref 70–99)
GLUCOSE BLDC GLUCOMTR-MCNC: 248 MG/DL — HIGH (ref 70–99)
GLUCOSE SERPL-MCNC: 185 MG/DL — HIGH (ref 70–99)
GRAM STN FLD: SIGNIFICANT CHANGE UP
HCT VFR BLD CALC: 36.1 % — LOW (ref 39–50)
HGB BLD-MCNC: 11.9 G/DL — LOW (ref 13–17)
IMM GRANULOCYTES NFR BLD AUTO: 0.3 % — SIGNIFICANT CHANGE UP (ref 0–1.5)
LYMPHOCYTES # BLD AUTO: 1.05 K/UL — SIGNIFICANT CHANGE UP (ref 1–3.3)
LYMPHOCYTES # BLD AUTO: 16.6 % — SIGNIFICANT CHANGE UP (ref 13–44)
MAGNESIUM SERPL-MCNC: 2 MG/DL — SIGNIFICANT CHANGE UP (ref 1.6–2.6)
MCHC RBC-ENTMCNC: 29.4 PG — SIGNIFICANT CHANGE UP (ref 27–34)
MCHC RBC-ENTMCNC: 33 % — SIGNIFICANT CHANGE UP (ref 32–36)
MCV RBC AUTO: 89.1 FL — SIGNIFICANT CHANGE UP (ref 80–100)
MONOCYTES # BLD AUTO: 0.8 K/UL — SIGNIFICANT CHANGE UP (ref 0–0.9)
MONOCYTES NFR BLD AUTO: 12.7 % — SIGNIFICANT CHANGE UP (ref 2–14)
NEUTROPHILS # BLD AUTO: 4.37 K/UL — SIGNIFICANT CHANGE UP (ref 1.8–7.4)
NEUTROPHILS NFR BLD AUTO: 69.3 % — SIGNIFICANT CHANGE UP (ref 43–77)
NRBC # FLD: 0 K/UL — SIGNIFICANT CHANGE UP (ref 0–0)
PHOSPHATE SERPL-MCNC: 2.9 MG/DL — SIGNIFICANT CHANGE UP (ref 2.5–4.5)
PLATELET # BLD AUTO: 205 K/UL — SIGNIFICANT CHANGE UP (ref 150–400)
PMV BLD: 11 FL — SIGNIFICANT CHANGE UP (ref 7–13)
POTASSIUM SERPL-MCNC: 3.1 MMOL/L — LOW (ref 3.5–5.3)
POTASSIUM SERPL-SCNC: 3.1 MMOL/L — LOW (ref 3.5–5.3)
RBC # BLD: 4.05 M/UL — LOW (ref 4.2–5.8)
RBC # FLD: 12.9 % — SIGNIFICANT CHANGE UP (ref 10.3–14.5)
SODIUM SERPL-SCNC: 136 MMOL/L — SIGNIFICANT CHANGE UP (ref 135–145)
SPECIMEN SOURCE: SIGNIFICANT CHANGE UP
WBC # BLD: 6.31 K/UL — SIGNIFICANT CHANGE UP (ref 3.8–10.5)
WBC # FLD AUTO: 6.31 K/UL — SIGNIFICANT CHANGE UP (ref 3.8–10.5)

## 2020-08-29 PROCEDURE — 99233 SBSQ HOSP IP/OBS HIGH 50: CPT | Mod: GC

## 2020-08-29 PROCEDURE — 99222 1ST HOSP IP/OBS MODERATE 55: CPT

## 2020-08-29 RX ORDER — POTASSIUM CHLORIDE 20 MEQ
40 PACKET (EA) ORAL EVERY 4 HOURS
Refills: 0 | Status: COMPLETED | OUTPATIENT
Start: 2020-08-29 | End: 2020-08-29

## 2020-08-29 RX ORDER — CALCIUM CARBONATE 500(1250)
1 TABLET ORAL ONCE
Refills: 0 | Status: COMPLETED | OUTPATIENT
Start: 2020-08-29 | End: 2020-08-29

## 2020-08-29 RX ORDER — INSULIN LISPRO 100/ML
4 VIAL (ML) SUBCUTANEOUS
Refills: 0 | Status: DISCONTINUED | OUTPATIENT
Start: 2020-08-29 | End: 2020-08-30

## 2020-08-29 RX ORDER — INSULIN GLARGINE 100 [IU]/ML
8 INJECTION, SOLUTION SUBCUTANEOUS
Qty: 1 | Refills: 0
Start: 2020-08-29

## 2020-08-29 RX ORDER — INSULIN LISPRO 100/ML
3 VIAL (ML) SUBCUTANEOUS
Qty: 1 | Refills: 0
Start: 2020-08-29

## 2020-08-29 RX ADMIN — SODIUM CHLORIDE 100 MILLILITER(S): 9 INJECTION INTRAMUSCULAR; INTRAVENOUS; SUBCUTANEOUS at 13:06

## 2020-08-29 RX ADMIN — SODIUM CHLORIDE 100 MILLILITER(S): 9 INJECTION INTRAMUSCULAR; INTRAVENOUS; SUBCUTANEOUS at 20:31

## 2020-08-29 RX ADMIN — Medication 3 UNIT(S): at 13:05

## 2020-08-29 RX ADMIN — Medication 3 UNIT(S): at 08:54

## 2020-08-29 RX ADMIN — Medication 1 TABLET(S): at 13:06

## 2020-08-29 RX ADMIN — SODIUM CHLORIDE 100 MILLILITER(S): 9 INJECTION INTRAMUSCULAR; INTRAVENOUS; SUBCUTANEOUS at 01:00

## 2020-08-29 RX ADMIN — Medication 1: at 08:54

## 2020-08-29 RX ADMIN — Medication 40 MILLIEQUIVALENT(S): at 11:13

## 2020-08-29 RX ADMIN — Medication 40 MILLIEQUIVALENT(S): at 16:25

## 2020-08-29 RX ADMIN — Medication 1: at 13:05

## 2020-08-29 RX ADMIN — INSULIN GLARGINE 8 UNIT(S): 100 INJECTION, SOLUTION SUBCUTANEOUS at 22:22

## 2020-08-29 RX ADMIN — Medication 1: at 18:19

## 2020-08-29 RX ADMIN — Medication 4 UNIT(S): at 18:18

## 2020-08-29 RX ADMIN — CEFTRIAXONE 100 MILLIGRAM(S): 500 INJECTION, POWDER, FOR SOLUTION INTRAMUSCULAR; INTRAVENOUS at 04:03

## 2020-08-29 RX ADMIN — Medication 600 MILLIGRAM(S): at 04:03

## 2020-08-29 NOTE — CONSULT NOTE ADULT - ASSESSMENT
50 year old male with history of DM Type 2 admitted for pneumonia consulted for inpatient Dm Management. Poor glycemic control with HgA1C of 9%, will be on basal bolus insulin inpatient

## 2020-08-29 NOTE — PROGRESS NOTE ADULT - PROBLEM SELECTOR PLAN 2
-cont abx as above  -legionella negative  -CT chest non con showing: increase in RLL consolidation, 4mm RUL pulm nodule, 3mm MADHAV pulm nodule, LLL lingula calcified granuloma  - trend fever curve, WBC  -will need follow up outpt 1 mo for resolution of PNA
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
## 2020-08-29 NOTE — PROGRESS NOTE ADULT - PROBLEM SELECTOR PLAN 4
Insulin naive, a1c 9.2%, was on metformin only at home  -cont lantus 8U and lispro 3U for now  -ISS  -endo following, appreciate recs

## 2020-08-29 NOTE — PROGRESS NOTE ADULT - PROBLEM SELECTOR PLAN 1
febrile, tachycardia, leukocytosis, imaging showing RLL PNA  -cont ceftriaxone (day 4/5)  -F/u Bcx and sputum cx

## 2020-08-29 NOTE — PROGRESS NOTE ADULT - ATTENDING COMMENTS
Remains afebrile since last temp at 3PM yesterday. Pt is uninsured, currently undergoing application with medicaid therefore insulin will not be covered. pt prefers second oral-antiglycemic. pending final Endo reccs regarding discharge anti-glycemics. Last day of IV abx today. Pt mentioned diarrhea has resolved, last BM several days ago. possible discharge today vs tomorrow

## 2020-08-29 NOTE — PROGRESS NOTE ADULT - SUBJECTIVE AND OBJECTIVE BOX
PROGRESS NOTE:     Mercy Galeano MD/PhD PGY-2  Internal [facility]  Pager: [phone]  **After 7pm, please contact night float**    Patient is a 50y old  Male who presents with a chief complaint of nausea, abdominal pain, fever (28 Aug 2020 17:30)      SUBJECTIVE / OVERNIGHT EVENTS: No acute events ON.     ADDITIONAL REVIEW OF SYSTEMS: No subjective or objective fever since 4p yesterday. Patient says he is feeling "much better." Denies dyspnea, cough, chest pain, abdominal pain. Tolerating PO well.     MEDICATIONS  (STANDING):  calcium carbonate   1250 mG (OsCal) 1 Tablet(s) Oral once  cefTRIAXone   IVPB 1000 milliGRAM(s) IV Intermittent every 24 hours  dextrose 5%. 1000 milliLiter(s) (50 mL/Hr) IV Continuous <Continuous>  dextrose 50% Injectable 12.5 Gram(s) IV Push once  dextrose 50% Injectable 25 Gram(s) IV Push once  dextrose 50% Injectable 25 Gram(s) IV Push once  enoxaparin Injectable 40 milliGRAM(s) SubCutaneous daily  insulin glargine Injectable (LANTUS) 8 Unit(s) SubCutaneous at bedtime  insulin lispro (HumaLOG) corrective regimen sliding scale   SubCutaneous three times a day before meals  insulin lispro (HumaLOG) corrective regimen sliding scale   SubCutaneous at bedtime  insulin lispro Injectable (HumaLOG) 3 Unit(s) SubCutaneous three times a day with meals  potassium chloride    Tablet ER 40 milliEquivalent(s) Oral every 4 hours  sodium chloride 0.9%. 1000 milliLiter(s) (100 mL/Hr) IV Continuous <Continuous>    MEDICATIONS  (PRN):  acetaminophen   Tablet .. 650 milliGRAM(s) Oral every 6 hours PRN Temp greater or equal to 38C (100.4F), Mild Pain (1 - 3), Moderate Pain (4 - 6)  dextrose 40% Gel 15 Gram(s) Oral once PRN Blood Glucose LESS THAN 70 milliGRAM(s)/deciliter  glucagon  Injectable 1 milliGRAM(s) IntraMuscular once PRN Glucose LESS THAN 70 milligrams/deciliter  senna 2 Tablet(s) Oral at bedtime PRN Constipation      CAPILLARY BLOOD GLUCOSE      POCT Blood Glucose.: 166 mg/dL (29 Aug 2020 08:44)  POCT Blood Glucose.: 136 mg/dL (28 Aug 2020 21:53)  POCT Blood Glucose.: 268 mg/dL (28 Aug 2020 17:25)  POCT Blood Glucose.: 205 mg/dL (28 Aug 2020 12:31)    I&O's Summary    28 Aug 2020 07:01  -  29 Aug 2020 07:00  --------------------------------------------------------  IN: 2820 mL / OUT: 500 mL / NET: 2320 mL        PHYSICAL EXAM:  Vital Signs Last 24 Hrs  T(C): 37.2 (29 Aug 2020 05:50), Max: 38.6 (28 Aug 2020 15:45)  T(F): 98.9 (29 Aug 2020 05:50), Max: 101.5 (28 Aug 2020 15:45)  HR: 75 (29 Aug 2020 05:50) (75 - 97)  BP: 133/54 (29 Aug 2020 05:50) (124/67 - 138/67)  BP(mean): --  RR: 18 (29 Aug 2020 05:50) (17 - 18)  SpO2: 100% (29 Aug 2020 05:50) (98% - 100%)    CONSTITUTIONAL: NAD, well-developed  RESPIRATORY: Reduced breath sounds in RLL, otherwise CTAB, no wheezes, rales, rhonchi. Good inspiratory effort  CARDIOVASCULAR: RRR, no murmurs, rubs, gallops. 2+ brachioradial and pedal pulses. Limbs warm and well-perfused. No edema.  ABDOMEN: Soft, nontender, nondistended. Normoactive bowel sounds. No HSM.  MUSCULOSKELETAL: Normal tone and strength. No trauma.  PSYCH: AOx3. Appropriate affect.    LABS:                        11.9   6.31  )-----------( 205      ( 29 Aug 2020 05:22 )             36.1     08-29    136  |  102  |  10  ----------------------------<  185<H>  3.1<L>   |  22  |  0.64    Ca    8.1<L>      29 Aug 2020 05:22  Phos  2.9     08-29  Mg     2.0     08-29          Culture - Blood (collected 28 Aug 2020 02:30)  Source: .Blood Blood-Venous  Preliminary Report (29 Aug 2020 03:02):    No growth to date.    Culture - Blood (collected 28 Aug 2020 02:30)  Source: .Blood Blood-Peripheral  Preliminary Report (29 Aug 2020 03:02):    No growth to date.      COORDINATION OF CARE:  Care Discussed with Consultants/Other Providers [Y/N]: Y  Prior or Outpatient Records Reviewed [Y/N]: Y

## 2020-08-29 NOTE — CONSULT NOTE ADULT - PROBLEM SELECTOR RECOMMENDATION 9
-Start Lantus 8 units at bedtime  -Increase humalog to 4 units before meals  -Low correctional scale  -Carb consistent diet  -Inpatient BG goal of 100-180   -FS qAC + HS     Discharge recommendations:   Will likely be sent home on Metformin + additional agent ( possibly SGLT 2 inhibitor or DPP 4 )   Will need follow up at [address] [place] [address] ( Endocrinology at [facility]) [phone]
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
## 2020-08-30 VITALS
SYSTOLIC BLOOD PRESSURE: 134 MMHG | DIASTOLIC BLOOD PRESSURE: 61 MMHG | HEART RATE: 84 BPM | OXYGEN SATURATION: 99 % | TEMPERATURE: 99 F | RESPIRATION RATE: 18 BRPM

## 2020-08-30 LAB
ALBUMIN SERPL ELPH-MCNC: 2.8 G/DL — LOW (ref 3.3–5)
ALP SERPL-CCNC: 61 U/L — SIGNIFICANT CHANGE UP (ref 40–120)
ALT FLD-CCNC: 76 U/L — HIGH (ref 4–41)
ANION GAP SERPL CALC-SCNC: 13 MMO/L — SIGNIFICANT CHANGE UP (ref 7–14)
AST SERPL-CCNC: 42 U/L — HIGH (ref 4–40)
BASOPHILS # BLD AUTO: 0.04 K/UL — SIGNIFICANT CHANGE UP (ref 0–0.2)
BASOPHILS NFR BLD AUTO: 0.7 % — SIGNIFICANT CHANGE UP (ref 0–2)
BILIRUB SERPL-MCNC: 0.5 MG/DL — SIGNIFICANT CHANGE UP (ref 0.2–1.2)
BUN SERPL-MCNC: 8 MG/DL — SIGNIFICANT CHANGE UP (ref 7–23)
CALCIUM SERPL-MCNC: 8.5 MG/DL — SIGNIFICANT CHANGE UP (ref 8.4–10.5)
CHLORIDE SERPL-SCNC: 102 MMOL/L — SIGNIFICANT CHANGE UP (ref 98–107)
CO2 SERPL-SCNC: 23 MMOL/L — SIGNIFICANT CHANGE UP (ref 22–31)
CREAT SERPL-MCNC: 0.53 MG/DL — SIGNIFICANT CHANGE UP (ref 0.5–1.3)
EOSINOPHIL # BLD AUTO: 0.13 K/UL — SIGNIFICANT CHANGE UP (ref 0–0.5)
EOSINOPHIL NFR BLD AUTO: 2.2 % — SIGNIFICANT CHANGE UP (ref 0–6)
GLUCOSE BLDC GLUCOMTR-MCNC: 169 MG/DL — HIGH (ref 70–99)
GLUCOSE BLDC GLUCOMTR-MCNC: 189 MG/DL — HIGH (ref 70–99)
GLUCOSE BLDC GLUCOMTR-MCNC: 199 MG/DL — HIGH (ref 70–99)
GLUCOSE SERPL-MCNC: 204 MG/DL — HIGH (ref 70–99)
HCT VFR BLD CALC: 33.6 % — LOW (ref 39–50)
HGB BLD-MCNC: 11.2 G/DL — LOW (ref 13–17)
IMM GRANULOCYTES NFR BLD AUTO: 0.3 % — SIGNIFICANT CHANGE UP (ref 0–1.5)
LYMPHOCYTES # BLD AUTO: 1.11 K/UL — SIGNIFICANT CHANGE UP (ref 1–3.3)
LYMPHOCYTES # BLD AUTO: 18.9 % — SIGNIFICANT CHANGE UP (ref 13–44)
MAGNESIUM SERPL-MCNC: 1.9 MG/DL — SIGNIFICANT CHANGE UP (ref 1.6–2.6)
MCHC RBC-ENTMCNC: 29.5 PG — SIGNIFICANT CHANGE UP (ref 27–34)
MCHC RBC-ENTMCNC: 33.3 % — SIGNIFICANT CHANGE UP (ref 32–36)
MCV RBC AUTO: 88.4 FL — SIGNIFICANT CHANGE UP (ref 80–100)
MONOCYTES # BLD AUTO: 0.67 K/UL — SIGNIFICANT CHANGE UP (ref 0–0.9)
MONOCYTES NFR BLD AUTO: 11.4 % — SIGNIFICANT CHANGE UP (ref 2–14)
NEUTROPHILS # BLD AUTO: 3.89 K/UL — SIGNIFICANT CHANGE UP (ref 1.8–7.4)
NEUTROPHILS NFR BLD AUTO: 66.5 % — SIGNIFICANT CHANGE UP (ref 43–77)
NRBC # FLD: 0 K/UL — SIGNIFICANT CHANGE UP (ref 0–0)
PHOSPHATE SERPL-MCNC: 2.8 MG/DL — SIGNIFICANT CHANGE UP (ref 2.5–4.5)
PLATELET # BLD AUTO: 222 K/UL — SIGNIFICANT CHANGE UP (ref 150–400)
PMV BLD: 10.7 FL — SIGNIFICANT CHANGE UP (ref 7–13)
POTASSIUM SERPL-MCNC: 3.9 MMOL/L — SIGNIFICANT CHANGE UP (ref 3.5–5.3)
POTASSIUM SERPL-SCNC: 3.9 MMOL/L — SIGNIFICANT CHANGE UP (ref 3.5–5.3)
PROT SERPL-MCNC: 5.4 G/DL — LOW (ref 6–8.3)
RBC # BLD: 3.8 M/UL — LOW (ref 4.2–5.8)
RBC # FLD: 13.4 % — SIGNIFICANT CHANGE UP (ref 10.3–14.5)
SODIUM SERPL-SCNC: 138 MMOL/L — SIGNIFICANT CHANGE UP (ref 135–145)
WBC # BLD: 5.86 K/UL — SIGNIFICANT CHANGE UP (ref 3.8–10.5)
WBC # FLD AUTO: 5.86 K/UL — SIGNIFICANT CHANGE UP (ref 3.8–10.5)

## 2020-08-30 PROCEDURE — 99239 HOSP IP/OBS DSCHRG MGMT >30: CPT | Mod: GC

## 2020-08-30 RX ORDER — METFORMIN HYDROCHLORIDE 850 MG/1
1 TABLET ORAL
Qty: 80 | Refills: 0
Start: 2020-08-30 | End: 2020-10-08

## 2020-08-30 RX ORDER — METFORMIN HYDROCHLORIDE 850 MG/1
1 TABLET ORAL
Qty: 0 | Refills: 0 | DISCHARGE

## 2020-08-30 RX ORDER — GLIMEPIRIDE 1 MG
1 TABLET ORAL
Qty: 40 | Refills: 0
Start: 2020-08-30 | End: 2020-10-08

## 2020-08-30 RX ORDER — METFORMIN HYDROCHLORIDE 850 MG/1
1 TABLET ORAL
Qty: 60 | Refills: 0
Start: 2020-08-30 | End: 2020-09-28

## 2020-08-30 RX ADMIN — CEFTRIAXONE 100 MILLIGRAM(S): 500 INJECTION, POWDER, FOR SOLUTION INTRAMUSCULAR; INTRAVENOUS at 03:56

## 2020-08-30 RX ADMIN — Medication 4 UNIT(S): at 13:43

## 2020-08-30 RX ADMIN — ENOXAPARIN SODIUM 40 MILLIGRAM(S): 100 INJECTION SUBCUTANEOUS at 13:44

## 2020-08-30 RX ADMIN — Medication 4 UNIT(S): at 18:31

## 2020-08-30 RX ADMIN — Medication 1: at 13:43

## 2020-08-30 RX ADMIN — SODIUM CHLORIDE 100 MILLILITER(S): 9 INJECTION INTRAMUSCULAR; INTRAVENOUS; SUBCUTANEOUS at 03:57

## 2020-08-30 RX ADMIN — Medication 1: at 18:31

## 2020-08-30 RX ADMIN — SODIUM CHLORIDE 100 MILLILITER(S): 9 INJECTION INTRAMUSCULAR; INTRAVENOUS; SUBCUTANEOUS at 13:44

## 2020-08-30 RX ADMIN — Medication 1: at 09:03

## 2020-08-30 RX ADMIN — Medication 4 UNIT(S): at 09:03

## 2020-08-30 NOTE — PROGRESS NOTE ADULT - SUBJECTIVE AND OBJECTIVE BOX
Patient will need to be discharged on affordable medication regimen: recommend metformin 1 g BID and amaryl 2mg with breakfast. He can f/u at [address] [phone].

## 2020-08-30 NOTE — PROGRESS NOTE ADULT - ATTENDING COMMENTS
clincally improved. afebrile for 48 hours. pending discharge today. clincally improved. afebrile for 48 hours. pending discharge today. Advised patient to follow strict diet and exercise and reviewed oral anti-glycemics. Able to verbalize signs of hypoglycemia and plan of action if it occurs. Discharge time plannin minutes

## 2020-08-30 NOTE — PROGRESS NOTE ADULT - PROBLEM SELECTOR PLAN 2
-cont abx as above  -legionella negative  -CT chest non con showing: increase in RLL consolidation, 4mm RUL pulm nodule, 3mm MADHAV pulm nodule, LLL lingula calcified granuloma  -Sputum cx pending; sputum gram stain- rare gram neg coccobacilli  -will need follow up outpt 1 mo for resolution of PNA

## 2020-08-30 NOTE — DISCHARGE NOTE NURSING/CASE MANAGEMENT/SOCIAL WORK - PATIENT PORTAL LINK FT
You can access the FollowMyHealth Patient Portal offered by [facility] by registering at the following website: [email or web address]. By joining [employer]’s FollowMyHealth portal, you will also be able to view your health information using other applications (apps) compatible with our system.

## 2020-08-30 NOTE — PROGRESS NOTE ADULT - PROVIDER SPECIALTY LIST ADULT
Endocrinology
Internal Medicine

## 2020-08-30 NOTE — PROGRESS NOTE ADULT - PROBLEM SELECTOR PROBLEM 1
Sepsis, due to unspecified organism, unspecified whether acute organ dysfunction present

## 2020-08-30 NOTE — PROGRESS NOTE ADULT - PROBLEM SELECTOR PLAN 4
Insulin naive, a1c 9.2%, was on metformin only at home  -cont lantus 8U and lispro 3U for now  -ISS  -endo following, appreciate recs-- below  Discharge recommendations:   Will likely be sent home on Metformin + additional agent ( possibly SGLT 2 inhibitor or DPP 4 )   Will need follow up at [address] [place] [address] ( Endocrinology at [place]) [phone].

## 2020-08-30 NOTE — PROGRESS NOTE ADULT - PROBLEM SELECTOR PLAN 1
febrile, tachycardia, leukocytosis, imaging showing RLL PNA  -afebrile for past 24h  -cont ceftriaxone (day 5/5)  -Bcx NGTD

## 2020-08-30 NOTE — PROGRESS NOTE ADULT - REASON FOR ADMISSION
nausea, abdominal pain, fever

## 2020-08-30 NOTE — PROGRESS NOTE ADULT - SUBJECTIVE AND OBJECTIVE BOX
PROGRESS NOTE:     CONTACT INFO:  Maeve Blackman MD | PGY-1  Pager: [phone] [place], [address] [facility]  After 7pm page night float      Patient is a 50y old  Male who presents with a chief complaint of nausea, abdominal pain, fever (29 Aug 2020 16:45)      SUBJECTIVE / OVERNIGHT EVENTS:    - No acute events overnight.   - No fevers/chills.  - Patient seen and evaluated at bedside.  - Denies SOB at rest, chest pain, palpitations, abdominal pain, nausea/vomiting  - Reports coughing up yellowish sputum since yesterday. Feeling well otherwise.    ADDITIONAL REVIEW OF SYSTEMS:    MEDICATIONS  (STANDING):  dextrose 5%. 1000 milliLiter(s) (50 mL/Hr) IV Continuous <Continuous>  dextrose 50% Injectable 12.5 Gram(s) IV Push once  dextrose 50% Injectable 25 Gram(s) IV Push once  dextrose 50% Injectable 25 Gram(s) IV Push once  enoxaparin Injectable 40 milliGRAM(s) SubCutaneous daily  insulin glargine Injectable (LANTUS) 8 Unit(s) SubCutaneous at bedtime  insulin lispro (HumaLOG) corrective regimen sliding scale   SubCutaneous three times a day before meals  insulin lispro (HumaLOG) corrective regimen sliding scale   SubCutaneous at bedtime  insulin lispro Injectable (HumaLOG) 4 Unit(s) SubCutaneous three times a day with meals  sodium chloride 0.9%. 1000 milliLiter(s) (100 mL/Hr) IV Continuous <Continuous>    MEDICATIONS  (PRN):  acetaminophen   Tablet .. 650 milliGRAM(s) Oral every 6 hours PRN Temp greater or equal to 38C (100.4F), Mild Pain (1 - 3), Moderate Pain (4 - 6)  dextrose 40% Gel 15 Gram(s) Oral once PRN Blood Glucose LESS THAN 70 milliGRAM(s)/deciliter  glucagon  Injectable 1 milliGRAM(s) IntraMuscular once PRN Glucose LESS THAN 70 milligrams/deciliter  senna 2 Tablet(s) Oral at bedtime PRN Constipation      CAPILLARY BLOOD GLUCOSE      POCT Blood Glucose.: 189 mg/dL (30 Aug 2020 08:46)  POCT Blood Glucose.: 248 mg/dL (29 Aug 2020 22:07)  POCT Blood Glucose.: 196 mg/dL (29 Aug 2020 17:36)  POCT Blood Glucose.: 199 mg/dL (29 Aug 2020 12:32)    I&O's Summary    29 Aug 2020 07:01  -  30 Aug 2020 07:00  --------------------------------------------------------  IN: 1250 mL / OUT: 0 mL / NET: 1250 mL        PHYSICAL EXAM:  Vital Signs Last 24 Hrs  T(C): 36.9 (30 Aug 2020 09:34), Max: 37.2 (29 Aug 2020 14:00)  T(F): 98.5 (30 Aug 2020 09:34), Max: 99 (29 Aug 2020 14:00)  HR: 70 (30 Aug 2020 09:34) (69 - 87)  BP: 135/68 (30 Aug 2020 09:34) (133/60 - 154/82)  BP(mean): --  RR: 18 (30 Aug 2020 09:34) (18 - 18)  SpO2: 99% (30 Aug 2020 09:34) (98% - 100%)    CONSTITUTIONAL: NAD, well-developed  RESPIRATORY: Normal respiratory effort; unlabored respirations, + crackles on R mid field and base  CARDIOVASCULAR: Regular rate and rhythm, normal S1 and S2, no murmur/rub/gallop; No lower extremity edema; Peripheral pulses are 2+ bilaterally  ABDOMEN: Nontender to palpation, normoactive bowel sounds, no rebound/guarding; No hepatosplenomegaly  MUSCLOSKELETAL: no clubbing or cyanosis of digits; no joint swelling or tenderness to palpation  PSYCH: A+O to person, place, and time; affect appropriate    LABS:                        11.2   5.86  )-----------( 222      ( 30 Aug 2020 05:44 )             33.6     08-30    138  |  102  |  8   ----------------------------<  204<H>  3.9   |  23  |  0.53    Ca    8.5      30 Aug 2020 05:44  Phos  2.8     08-30  Mg     1.9     08-30    TPro  5.4<L>  /  Alb  2.8<L>  /  TBili  0.5  /  DBili  x   /  AST  42<H>  /  ALT  76<H>  /  AlkPhos  61  08-30              Culture - Sputum (collected 29 Aug 2020 16:12)  Source: .Sputum Sputum  Gram Stain (29 Aug 2020 23:15):    No polymorphonuclear leukocytes per low power field    No Squamous epithelial cells per low power field    Rare Gram Negative Coccobacilli per oil power field    Culture - Blood (collected 28 Aug 2020 02:30)  Source: .Blood Blood-Venous  Preliminary Report (29 Aug 2020 03:02):    No growth to date.    Culture - Blood (collected 28 Aug 2020 02:30)  Source: .Blood Blood-Peripheral  Preliminary Report (29 Aug 2020 03:02):    No growth to date.        RADIOLOGY & ADDITIONAL TESTS:  Results Reviewed:   Imaging Personally Reviewed:  Electrocardiogram Personally Reviewed:    COORDINATION OF CARE:  Care Discussed with Consultants/Other Providers [Y/N]: Y  Prior or Outpatient Records Reviewed [Y/N]: Y

## 2020-08-31 LAB
CULTURE RESULTS: SIGNIFICANT CHANGE UP
SPECIMEN SOURCE: SIGNIFICANT CHANGE UP

## 2020-09-02 LAB
CULTURE RESULTS: SIGNIFICANT CHANGE UP
CULTURE RESULTS: SIGNIFICANT CHANGE UP
SPECIMEN SOURCE: SIGNIFICANT CHANGE UP
SPECIMEN SOURCE: SIGNIFICANT CHANGE UP
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
## 2022-01-21 NOTE — DISCHARGE NOTE PROVIDER - YES NO FOR MLM POSITIVE OR NEGATIVE COVID RESULT
Pt admitted to IPP with bipolar disorder, major depressive disorder; referred to PT for evaluation of functional status (pt previously evaled, provided with RW, and D/C'd)
,
75yo F with hx of BPD, MDD, HTN, DM here with slowly progressive depression, anhedonia and forgetfulness that is frustrating her for the past few weeks. Pt was sent for passive SI and feeling more anxious. denies SI upon interview no HI or AVH. Denies CP, SOB, abd pain, HA, fever, chills or any other systemic complaints.
Pt admitted to IPP with bipolar disorder, major depressive disorder - referred for PT due to difficulty ambulating, uses RW at home

## 2022-07-07 NOTE — PROVIDER CONTACT NOTE (OTHER) - BACKGROUND
From: Rickey Shane  Sent: 7/7/2022 2:34 PM CDT  To: Emerson Pollock 2 Nurse  Subject: Medical paperwork    I'm so sorry to bother you because I know your very busy.
Pt was admitted for PNA and sepsis
patient admitted for sepsis
patient admitted with sepsis

## 2022-11-02 NOTE — ED PROVIDER NOTE - PROGRESS NOTE DETAILS
Pregnancy Resources    Childbirth and Parenting Education:   [place] parenting center: http://[facility] HospitalHot Hotels/   [phone]  [place]: (education, yoga & wellness) www.Talasim.[employer]  Enlightened Mama: www.enlightenedmama.[employer]   Childbirth collective: (Parent topic nights)  www.childbirthcollective.org/  Hypnobabies:  www.hypnobabiestChemoCentryx.[employer]/  Hypnobirthing:  Http://hypnobirthing.com/    Information about doulas:  Childbirth collective: http://www.childbirthcollective.org/  Doulas of [place] (MARTINA):  www.martina.org  Professionali.ru  project: http://agn[employer] EnergydoWellocities.[employer]/       Book Recommendations:      Cristy Emory's Birthing From Within--first few chapters include a new-age tone, you may prefer to skip it and keep going, because there is good stuff later.  This book recommendation covers emotional preparation, but does cover coping with pain, and use of both pharmacological and nonpharmacological methods.     Dr. Alvarez' The Pregnancy Book and The Birth Book--the pregnancy book goes month-by month     Womanly Art of Breastfeeding by La Leche League International   Bestfeeding by Marilee Stapleton--great pictures     Mothering Your Nursing Toddler, by Adelina Guerrero.   Addresses dealing with so many of the challenging behaviors of a nursing toddler.     How Weaning Happens, by La Leche League.  Discusses weaning at all ages, from medically necessary weaning of an infant, all the way up to age 5 (or older), with why/why not, and strategies.  Very empowering book both for deciding to wean and deciding not to.    Internet Resources:     American College of Nurse-Midwives (ACNM) http://www.midwife.org/; look at the informational handouts at http://www.midwife.org/Share-With-Women     www.mymidwife.org     Mother to Baby (Medication and Herbal guidance in pregnancy): http://www.mothertobaby.org  Toll-Free Hotline: 981.970.1990     LactMed (Medication use while breastfeeding):  "http://toxnet.nlm.nih.gov/newtoxnet/lactmed.htm     Women's Health.gov:  http://www.womenshealth.gov/a-z-topics/index.html     American pregnancy association - http://americanpregnancy.org      Early Childhood and Family Education (ECFE):  ECFE offers parents hands-on learning experiences that will nourish a lifetime of teachable moments.  http://ecfe.info/ecfe-home/     March of Dimes www.[employer]     FDA - Nutrition  www.mypyramid.gov  Under \"For Consumers,\" click on \"pregnant and breastfeeding women.\"      Centers for Disease Control and Prevention (CDC) - Vaccines : http://www.cdc.gov/vaccines/        When researching information on the web, question the validity of websites.  The domains .gov, .edu and.org tend to be more reliable information.  If there are a lot of advertisements, be cautious of the information provided. Blogs and chat rooms can often have incorrect information.       " WBC 22, lactate 2.2 WBC 22, lactate 2.2, CT A/P shows RLL PNA. No diarrhea since being in ED, no exposure to antibiotics/hospital recently, unlikely C diff. Will admit and start Zosyn.

## 2022-11-09 NOTE — ED ADULT NURSE NOTE - CHIEF COMPLAINT QUOTE
states" I have nausea chest and back pain since Saturday with elevated blood sugar" fever 102.5 in triage. . h/o DM. denies any sick contacts. c/o body aches. DIABETES FOLLOW UP NOTE: Saw pt earlier today    Chief Complaint: Endocrine consult requested for management of T2DM    INTERVAL HX: Pt transferred from CTU to CICU unit but per team she remains a CTU pt. Pt tolerating 24 hour TFs of Glucerna at 65cc/hr. BG levels between 80s to low 100s today. Noted NPH dose for 6am daily administered last night at 6pm instead and pt received another dose at 6am this am. Pt walking with PT at time of visit and denies feeling hypoglycemic symptoms. Spoke to primary team to check BG q1 h today. Pt might need D10 infusion of she becomes hypoglycemic. On chronic Prednisone 8mg for AI. On antibiotics for sepsis. Pt able to talk via trach voice valve.         Review of Systems:  General: As above  Cardiovascular: No chest pain, palpitations  Respiratory: No SOB, no cough  GI: No nausea, vomiting, abdominal pain  Endocrine: No polyuria, polydipsia or S&Sx of hypoglycemia    Allergies    aspirin (Short breath)  Avelox (Short breath; Pruritus)  cefepime (Anaphylaxis)  codeine (Short breath)  Dilaudid (Short breath)  iodine (Short breath; Swelling)  penicillin (Anaphylaxis)  shellfish (Anaphylaxis)  tetanus toxoid (Short breath)  Valium (Short breath)    Intolerances      MEDICATIONS:  insulin lispro (ADMELOG) corrective regimen sliding scale   SubCutaneous every 6 hours  insulin NPH human recombinant 50 Unit(s) SubCutaneous <User Schedule>  insulin NPH human recombinant 8 Unit(s) SubCutaneous <User Schedule>  methylPREDNISolone 8 milliGRAM(s) Oral daily  vancomycin  IVPB 750 milliGRAM(s) IV Intermittent every 12 hours        PHYSICAL EXAM:  VITALS: T(C): 36.7 (11-09-22 @ 12:24)  T(F): 98 (11-09-22 @ 12:24), Max: 98.5 (11-09-22 @ 00:00)  HR: 76 (11-09-22 @ 12:24) (65 - 93)  BP: 126/64 (11-09-22 @ 12:24) (102/49 - 166/71)  RR:  (16 - 45)  SpO2:  (95% - 100%)  Wt(kg): --  GENERAL: Female sitting in chair in NAD.   HEENT: Trach in place, NGT with TFs off at time of visit> walking with pt.     Chest: Sternal incision healed   Abdomen: Soft, nontender, non distended  Extremities: Warm, no edema in all 4 exts. RUE wound to vac with whitish foamy out put.   NEURO: Alert, able to answer simple questions      LABS:  POCT Blood Glucose.: 93 mg/dL (11-09-22 @ 13:29)  POCT Blood Glucose.: 104 mg/dL (11-09-22 @ 11:09)  POCT Blood Glucose.: 121 mg/dL (11-09-22 @ 05:49)  POCT Blood Glucose.: 88 mg/dL (11-08-22 @ 23:58)  POCT Blood Glucose.: 181 mg/dL (11-08-22 @ 18:09)  POCT Blood Glucose.: 259 mg/dL (11-08-22 @ 11:39)  POCT Blood Glucose.: 146 mg/dL (11-08-22 @ 06:02)  POCT Blood Glucose.: 187 mg/dL (11-08-22 @ 00:06)  POCT Blood Glucose.: 89 mg/dL (11-07-22 @ 17:18)  POCT Blood Glucose.: 224 mg/dL (11-07-22 @ 11:43)  POCT Blood Glucose.: 143 mg/dL (11-07-22 @ 06:52)  POCT Blood Glucose.: 114 mg/dL (11-07-22 @ 00:54)  POCT Blood Glucose.: 127 mg/dL (11-06-22 @ 17:48)  POCT Blood Glucose.: 65 mg/dL (11-06-22 @ 17:14)  POCT Blood Glucose.: 66 mg/dL (11-06-22 @ 17:12)                            9.2    13.63 )-----------( 280      ( 09 Nov 2022 00:49 )             30.2       11-09    136  |  99  |  16  ----------------------------<  88  4.1   |  27  |  0.42<L>    eGFR: 103    Ca    9.2      11-09  Mg     1.8     11-09  Phos  3.6     11-09    TPro  6.7  /  Alb  3.2<L>  /  TBili  0.2  /  DBili  x   /  AST  15  /  ALT  14  /  AlkPhos  116  11-09    A1C with Estimated Average Glucose Result: 9.4 % (09-06-22 @ 16:46)      Estimated Average Glucose: 223 mg/dL (09-06-22 @ 16:46)

## 2023-11-11 NOTE — PROGRESS NOTE ADULT - PROBLEM SELECTOR PLAN 4
Goal Outcome Evaluation:  Plan of Care Reviewed With: patient        Progress: no change  Outcome Evaluation: Pt A&O to person, place but forgetful with situation.  NIHSS 3.  No new neurological changes during the shift. Possible discharge today to [facility] via wheelchair van scheduled for 1400.  VSS.  WCTM for the rest of the shift.          Insulin naive  - lispro SS  - fsg before meals and bedtime Insulin naive, a1c 9.2%, was on metformin only at home  -start basal bolus  -ISS  - fsg before meals and bedtime